# Patient Record
Sex: FEMALE | Race: WHITE | HISPANIC OR LATINO | Employment: FULL TIME | URBAN - METROPOLITAN AREA
[De-identification: names, ages, dates, MRNs, and addresses within clinical notes are randomized per-mention and may not be internally consistent; named-entity substitution may affect disease eponyms.]

---

## 2021-10-08 ENCOUNTER — ULTRASOUND (OUTPATIENT)
Dept: OBGYN CLINIC | Facility: CLINIC | Age: 31
End: 2021-10-08

## 2021-10-08 VITALS
HEIGHT: 61 IN | SYSTOLIC BLOOD PRESSURE: 107 MMHG | WEIGHT: 143 LBS | BODY MASS INDEX: 27 KG/M2 | HEART RATE: 88 BPM | DIASTOLIC BLOOD PRESSURE: 74 MMHG

## 2021-10-08 DIAGNOSIS — Z3A.12 12 WEEKS GESTATION OF PREGNANCY: Primary | ICD-10-CM

## 2021-10-08 PROCEDURE — 99203 OFFICE O/P NEW LOW 30 MIN: CPT | Performed by: OBSTETRICS & GYNECOLOGY

## 2021-10-08 RX ORDER — PNV NO.95/FERROUS FUM/FOLIC AC 28MG-0.8MG
1 TABLET ORAL DAILY
Qty: 90 TABLET | Refills: 3 | Status: SHIPPED | OUTPATIENT
Start: 2021-10-08 | End: 2022-05-16

## 2021-10-11 ENCOUNTER — APPOINTMENT (OUTPATIENT)
Dept: LAB | Facility: HOSPITAL | Age: 31
End: 2021-10-11

## 2021-10-11 DIAGNOSIS — Z3A.12 12 WEEKS GESTATION OF PREGNANCY: ICD-10-CM

## 2021-10-11 LAB
ABO GROUP BLD: NORMAL
BASOPHILS # BLD AUTO: 0.05 THOUSANDS/ΜL (ref 0–0.1)
BASOPHILS NFR BLD AUTO: 1 % (ref 0–1)
BILIRUB UR QL STRIP: NEGATIVE
BLD GP AB SCN SERPL QL: NEGATIVE
CLARITY UR: NORMAL
COLOR UR: YELLOW
EOSINOPHIL # BLD AUTO: 0.05 THOUSAND/ΜL (ref 0–0.61)
EOSINOPHIL NFR BLD AUTO: 1 % (ref 0–6)
ERYTHROCYTE [DISTWIDTH] IN BLOOD BY AUTOMATED COUNT: 11.9 % (ref 11.6–15.1)
GLUCOSE UR STRIP-MCNC: NEGATIVE MG/DL
HBV SURFACE AG SER QL: NORMAL
HCT VFR BLD AUTO: 37.8 % (ref 34.8–46.1)
HGB BLD-MCNC: 12.9 G/DL (ref 11.5–15.4)
HGB UR QL STRIP.AUTO: NEGATIVE
IMM GRANULOCYTES # BLD AUTO: 0.01 THOUSAND/UL (ref 0–0.2)
IMM GRANULOCYTES NFR BLD AUTO: 0 % (ref 0–2)
KETONES UR STRIP-MCNC: NEGATIVE MG/DL
LEUKOCYTE ESTERASE UR QL STRIP: NEGATIVE
LYMPHOCYTES # BLD AUTO: 1.77 THOUSANDS/ΜL (ref 0.6–4.47)
LYMPHOCYTES NFR BLD AUTO: 27 % (ref 14–44)
MCH RBC QN AUTO: 30.4 PG (ref 26.8–34.3)
MCHC RBC AUTO-ENTMCNC: 34.1 G/DL (ref 31.4–37.4)
MCV RBC AUTO: 89 FL (ref 82–98)
MONOCYTES # BLD AUTO: 0.46 THOUSAND/ΜL (ref 0.17–1.22)
MONOCYTES NFR BLD AUTO: 7 % (ref 4–12)
NEUTROPHILS # BLD AUTO: 4.14 THOUSANDS/ΜL (ref 1.85–7.62)
NEUTS SEG NFR BLD AUTO: 64 % (ref 43–75)
NITRITE UR QL STRIP: NEGATIVE
NRBC BLD AUTO-RTO: 0 /100 WBCS
PH UR STRIP.AUTO: 7.5 [PH]
PLATELET # BLD AUTO: 252 THOUSANDS/UL (ref 149–390)
PMV BLD AUTO: 11.1 FL (ref 8.9–12.7)
PROT UR STRIP-MCNC: NEGATIVE MG/DL
RBC # BLD AUTO: 4.24 MILLION/UL (ref 3.81–5.12)
RH BLD: POSITIVE
RUBV IGG SERPL IA-ACNC: >175 IU/ML
SP GR UR STRIP.AUTO: 1.02 (ref 1–1.03)
SPECIMEN EXPIRATION DATE: NORMAL
UROBILINOGEN UR QL STRIP.AUTO: 1 E.U./DL
WBC # BLD AUTO: 6.48 THOUSAND/UL (ref 4.31–10.16)

## 2021-10-11 PROCEDURE — 36415 COLL VENOUS BLD VENIPUNCTURE: CPT

## 2021-10-11 PROCEDURE — 80081 OBSTETRIC PANEL INC HIV TSTG: CPT

## 2021-10-11 PROCEDURE — 87086 URINE CULTURE/COLONY COUNT: CPT

## 2021-10-11 PROCEDURE — 81003 URINALYSIS AUTO W/O SCOPE: CPT

## 2021-10-12 ENCOUNTER — INITIAL PRENATAL (OUTPATIENT)
Dept: OBGYN CLINIC | Facility: CLINIC | Age: 31
End: 2021-10-12

## 2021-10-12 VITALS — WEIGHT: 145.4 LBS | BODY MASS INDEX: 29.31 KG/M2 | HEIGHT: 59 IN

## 2021-10-12 DIAGNOSIS — Z3A.12 12 WEEKS GESTATION OF PREGNANCY: Primary | ICD-10-CM

## 2021-10-12 LAB
HIV 1+2 AB+HIV1 P24 AG SERPL QL IA: NORMAL
RPR SER QL: NORMAL

## 2021-10-13 LAB — BACTERIA UR CULT: NORMAL

## 2021-10-15 ENCOUNTER — ROUTINE PRENATAL (OUTPATIENT)
Dept: PERINATAL CARE | Facility: OTHER | Age: 31
End: 2021-10-15

## 2021-10-15 VITALS
HEIGHT: 59 IN | DIASTOLIC BLOOD PRESSURE: 78 MMHG | WEIGHT: 143 LBS | BODY MASS INDEX: 28.83 KG/M2 | HEART RATE: 72 BPM | SYSTOLIC BLOOD PRESSURE: 110 MMHG

## 2021-10-15 DIAGNOSIS — Z36.82 ENCOUNTER FOR NUCHAL TRANSLUCENCY TESTING: ICD-10-CM

## 2021-10-15 DIAGNOSIS — O36.80X0 ENCOUNTER TO DETERMINE FETAL VIABILITY OF PREGNANCY, SINGLE OR UNSPECIFIED FETUS: Primary | ICD-10-CM

## 2021-10-15 DIAGNOSIS — Z3A.13 13 WEEKS GESTATION OF PREGNANCY: ICD-10-CM

## 2021-10-15 PROCEDURE — 76801 OB US < 14 WKS SINGLE FETUS: CPT | Performed by: OBSTETRICS & GYNECOLOGY

## 2021-10-15 PROCEDURE — 76813 OB US NUCHAL MEAS 1 GEST: CPT | Performed by: OBSTETRICS & GYNECOLOGY

## 2021-10-15 PROCEDURE — 99241 PR OFFICE CONSULTATION NEW/ESTAB PATIENT 15 MIN: CPT | Performed by: OBSTETRICS & GYNECOLOGY

## 2021-11-04 ENCOUNTER — ROUTINE PRENATAL (OUTPATIENT)
Dept: OBGYN CLINIC | Facility: CLINIC | Age: 31
End: 2021-11-04

## 2021-11-04 VITALS
BODY MASS INDEX: 29.03 KG/M2 | HEART RATE: 67 BPM | WEIGHT: 144 LBS | DIASTOLIC BLOOD PRESSURE: 72 MMHG | SYSTOLIC BLOOD PRESSURE: 108 MMHG | HEIGHT: 59 IN

## 2021-11-04 DIAGNOSIS — Z34.92 PRENATAL CARE, SECOND TRIMESTER: Primary | ICD-10-CM

## 2021-11-04 PROCEDURE — G0145 SCR C/V CYTO,THINLAYER,RESCR: HCPCS | Performed by: STUDENT IN AN ORGANIZED HEALTH CARE EDUCATION/TRAINING PROGRAM

## 2021-11-04 PROCEDURE — 87491 CHLMYD TRACH DNA AMP PROBE: CPT | Performed by: STUDENT IN AN ORGANIZED HEALTH CARE EDUCATION/TRAINING PROGRAM

## 2021-11-04 PROCEDURE — 99213 OFFICE O/P EST LOW 20 MIN: CPT | Performed by: OBSTETRICS & GYNECOLOGY

## 2021-11-04 PROCEDURE — G0476 HPV COMBO ASSAY CA SCREEN: HCPCS | Performed by: STUDENT IN AN ORGANIZED HEALTH CARE EDUCATION/TRAINING PROGRAM

## 2021-11-04 PROCEDURE — 87591 N.GONORRHOEAE DNA AMP PROB: CPT | Performed by: STUDENT IN AN ORGANIZED HEALTH CARE EDUCATION/TRAINING PROGRAM

## 2021-11-05 LAB
C TRACH DNA SPEC QL NAA+PROBE: NEGATIVE
N GONORRHOEA DNA SPEC QL NAA+PROBE: NEGATIVE

## 2021-11-06 LAB
HPV HR 12 DNA CVX QL NAA+PROBE: NEGATIVE
HPV16 DNA CVX QL NAA+PROBE: NEGATIVE
HPV18 DNA CVX QL NAA+PROBE: NEGATIVE

## 2021-11-11 LAB
LAB AP GYN PRIMARY INTERPRETATION: NORMAL
Lab: NORMAL

## 2021-12-02 ENCOUNTER — ROUTINE PRENATAL (OUTPATIENT)
Dept: OBGYN CLINIC | Facility: CLINIC | Age: 31
End: 2021-12-02

## 2021-12-02 ENCOUNTER — PATIENT OUTREACH (OUTPATIENT)
Dept: OBGYN CLINIC | Facility: CLINIC | Age: 31
End: 2021-12-02

## 2021-12-02 VITALS
DIASTOLIC BLOOD PRESSURE: 67 MMHG | BODY MASS INDEX: 29.84 KG/M2 | HEIGHT: 59 IN | WEIGHT: 148 LBS | SYSTOLIC BLOOD PRESSURE: 103 MMHG | HEART RATE: 69 BPM

## 2021-12-02 DIAGNOSIS — Z3A.20 20 WEEKS GESTATION OF PREGNANCY: ICD-10-CM

## 2021-12-02 DIAGNOSIS — Z34.92 PRENATAL CARE, SECOND TRIMESTER: Primary | ICD-10-CM

## 2021-12-02 DIAGNOSIS — Z78.9 LANGUAGE BARRIER: ICD-10-CM

## 2021-12-02 PROCEDURE — 99213 OFFICE O/P EST LOW 20 MIN: CPT | Performed by: NURSE PRACTITIONER

## 2021-12-06 ENCOUNTER — ROUTINE PRENATAL (OUTPATIENT)
Dept: PERINATAL CARE | Facility: CLINIC | Age: 31
End: 2021-12-06
Payer: COMMERCIAL

## 2021-12-06 VITALS
WEIGHT: 149.2 LBS | HEIGHT: 59 IN | SYSTOLIC BLOOD PRESSURE: 117 MMHG | HEART RATE: 86 BPM | BODY MASS INDEX: 30.08 KG/M2 | DIASTOLIC BLOOD PRESSURE: 65 MMHG

## 2021-12-06 DIAGNOSIS — Z3A.20 20 WEEKS GESTATION OF PREGNANCY: ICD-10-CM

## 2021-12-06 DIAGNOSIS — Z36.86 ENCOUNTER FOR ANTENATAL SCREENING FOR CERVICAL LENGTH: ICD-10-CM

## 2021-12-06 DIAGNOSIS — O09.292 PRIOR FETAL MACROSOMIA IN SECOND TRIMESTER, ANTEPARTUM: Primary | ICD-10-CM

## 2021-12-06 PROCEDURE — 76817 TRANSVAGINAL US OBSTETRIC: CPT | Performed by: OBSTETRICS & GYNECOLOGY

## 2021-12-06 PROCEDURE — 76811 OB US DETAILED SNGL FETUS: CPT | Performed by: OBSTETRICS & GYNECOLOGY

## 2021-12-06 PROCEDURE — 99213 OFFICE O/P EST LOW 20 MIN: CPT | Performed by: OBSTETRICS & GYNECOLOGY

## 2021-12-13 ENCOUNTER — TELEPHONE (OUTPATIENT)
Dept: PERINATAL CARE | Facility: CLINIC | Age: 31
End: 2021-12-13

## 2021-12-23 ENCOUNTER — LAB (OUTPATIENT)
Dept: LAB | Facility: CLINIC | Age: 31
End: 2021-12-23
Payer: COMMERCIAL

## 2021-12-23 DIAGNOSIS — Z3A.20 20 WEEKS GESTATION OF PREGNANCY: ICD-10-CM

## 2021-12-23 DIAGNOSIS — O09.292 PRIOR FETAL MACROSOMIA IN SECOND TRIMESTER, ANTEPARTUM: ICD-10-CM

## 2021-12-23 LAB — GLUCOSE 1H P 50 G GLC PO SERPL-MCNC: 105 MG/DL (ref 40–134)

## 2021-12-23 PROCEDURE — 82950 GLUCOSE TEST: CPT

## 2021-12-23 PROCEDURE — 36415 COLL VENOUS BLD VENIPUNCTURE: CPT

## 2021-12-30 ENCOUNTER — ROUTINE PRENATAL (OUTPATIENT)
Dept: OBGYN CLINIC | Facility: CLINIC | Age: 31
End: 2021-12-30

## 2021-12-30 VITALS
SYSTOLIC BLOOD PRESSURE: 100 MMHG | BODY MASS INDEX: 30.84 KG/M2 | WEIGHT: 153 LBS | HEIGHT: 59 IN | DIASTOLIC BLOOD PRESSURE: 63 MMHG | HEART RATE: 64 BPM

## 2021-12-30 DIAGNOSIS — Z34.92 PRENATAL CARE IN SECOND TRIMESTER: Primary | ICD-10-CM

## 2021-12-30 DIAGNOSIS — Z3A.24 24 WEEKS GESTATION OF PREGNANCY: ICD-10-CM

## 2021-12-30 PROBLEM — Z3A.20 20 WEEKS GESTATION OF PREGNANCY: Status: RESOLVED | Noted: 2021-10-15 | Resolved: 2021-12-30

## 2021-12-30 PROCEDURE — 99213 OFFICE O/P EST LOW 20 MIN: CPT | Performed by: OBSTETRICS & GYNECOLOGY

## 2021-12-30 PROCEDURE — 90686 IIV4 VACC NO PRSV 0.5 ML IM: CPT | Performed by: OBSTETRICS & GYNECOLOGY

## 2021-12-30 PROCEDURE — 90471 IMMUNIZATION ADMIN: CPT | Performed by: OBSTETRICS & GYNECOLOGY

## 2022-01-27 ENCOUNTER — ROUTINE PRENATAL (OUTPATIENT)
Dept: OBGYN CLINIC | Facility: CLINIC | Age: 32
End: 2022-01-27

## 2022-01-27 VITALS
WEIGHT: 156 LBS | HEART RATE: 74 BPM | BODY MASS INDEX: 31.45 KG/M2 | DIASTOLIC BLOOD PRESSURE: 68 MMHG | SYSTOLIC BLOOD PRESSURE: 106 MMHG | HEIGHT: 59 IN

## 2022-01-27 DIAGNOSIS — Z23 NEED FOR TDAP VACCINATION: ICD-10-CM

## 2022-01-27 DIAGNOSIS — Z34.93 PRENATAL CARE IN THIRD TRIMESTER: Primary | ICD-10-CM

## 2022-01-27 PROCEDURE — 90715 TDAP VACCINE 7 YRS/> IM: CPT

## 2022-01-27 PROCEDURE — 99213 OFFICE O/P EST LOW 20 MIN: CPT | Performed by: OBSTETRICS & GYNECOLOGY

## 2022-01-27 PROCEDURE — 90471 IMMUNIZATION ADMIN: CPT

## 2022-01-27 NOTE — PROGRESS NOTES
[address]  [facility]  OB/GYN prenatal visit    S: 28 y o  [id number] 28w0d here for PN visit  She has no obstetric complaints, including pelvic pain, contractions, vaginal bleeding, loss of fluid, or decreased fetal movement   no    O:  Vitals:    22 1033   BP: 106/68   Pulse: 74       Gen: no acute distress, nonlabored breathing  Fundal Height (cm): 27 cm  Fetal Heart Rate: 130    A/P:      IUP at 28w0d  No obstetric complaints today  Vaccinations: Flu , Tdap given today  Contraception: Depo  Breastfeeding: yes  Birth plan: vaginal delivery  Discussed  labor precautions and fetal kick counts    Return to clinic in 2 weeks  Delivery consent to be signed at next visit  28w labs given    COVID 19 precautions were discussed with patient at length, reviewed symptoms, hygiene, social distancing, patient to call office  with questions/concerns        Juliana Donahue MD  2022  11:57 AM

## 2022-01-27 NOTE — PROGRESS NOTES
28 week education packet provided to patient on 1/27/2022  Included in packet:   Third Trimester paperwork  Delivery consent   Birthing room support person rules and acknowledgment  Birth Plan   Welcome information  Birth certificate worksheet   Consent for Photographers  Perineal/ Vaginal massage   Pediatric practices and locations

## 2022-02-10 ENCOUNTER — ROUTINE PRENATAL (OUTPATIENT)
Dept: OBGYN CLINIC | Facility: CLINIC | Age: 32
End: 2022-02-10

## 2022-02-10 VITALS
BODY MASS INDEX: 31.25 KG/M2 | DIASTOLIC BLOOD PRESSURE: 66 MMHG | SYSTOLIC BLOOD PRESSURE: 102 MMHG | HEART RATE: 73 BPM | HEIGHT: 59 IN | WEIGHT: 155 LBS

## 2022-02-10 DIAGNOSIS — Z34.93 PRENATAL CARE, THIRD TRIMESTER: Primary | ICD-10-CM

## 2022-02-10 PROCEDURE — 99213 OFFICE O/P EST LOW 20 MIN: CPT | Performed by: OBSTETRICS & GYNECOLOGY

## 2022-02-10 NOTE — PROGRESS NOTES
[address]  [facility]  OB/GYN prenatal visit    S: 28 y o  [id number] 30w0d here for PN visit  She has no obstetric complaints, including pelvic pain, contractions, vaginal bleeding, loss of fluid, or decreased fetal movement       O:  Vitals:    02/10/22 1032   BP: 102/66   Pulse: 73       Gen: no acute distress, nonlabored breathing  Fundal Height (cm): 29 cm  Fetal Heart Rate: 135    A/P:      IUP at 30w0d  No obstetric complaints today  Vaccinations: UTD on tdap and flu  Contraception: Depo  Breastfeeding: yes  Birth plan: discussed elective induction/repeat /TOLAC, patient opts for vaginal delivery  Discussed  labor precautions and fetal kick counts    Return to clinic in 2 weeks        COVID 19 precautions were discussed with patient at length, reviewed symptoms, hygiene, social distancing, patient to call office  with Randal Dickerson MD  2/10/2022  10:57 AM

## 2022-02-17 ENCOUNTER — APPOINTMENT (OUTPATIENT)
Dept: LAB | Facility: CLINIC | Age: 32
End: 2022-02-17
Payer: COMMERCIAL

## 2022-02-17 DIAGNOSIS — Z34.93 PRENATAL CARE IN THIRD TRIMESTER: ICD-10-CM

## 2022-02-17 LAB
ERYTHROCYTE [DISTWIDTH] IN BLOOD BY AUTOMATED COUNT: 12.5 % (ref 11.6–15.1)
GLUCOSE 1H P 50 G GLC PO SERPL-MCNC: 103 MG/DL (ref 40–134)
HCT VFR BLD AUTO: 36.4 % (ref 34.8–46.1)
HGB BLD-MCNC: 12.2 G/DL (ref 11.5–15.4)
MCH RBC QN AUTO: 31.1 PG (ref 26.8–34.3)
MCHC RBC AUTO-ENTMCNC: 33.5 G/DL (ref 31.4–37.4)
MCV RBC AUTO: 93 FL (ref 82–98)
PLATELET # BLD AUTO: 229 THOUSANDS/UL (ref 149–390)
PMV BLD AUTO: 10.3 FL (ref 8.9–12.7)
RBC # BLD AUTO: 3.92 MILLION/UL (ref 3.81–5.12)
WBC # BLD AUTO: 6.82 THOUSAND/UL (ref 4.31–10.16)

## 2022-02-17 PROCEDURE — 36415 COLL VENOUS BLD VENIPUNCTURE: CPT

## 2022-02-17 PROCEDURE — 82950 GLUCOSE TEST: CPT

## 2022-02-17 PROCEDURE — 86592 SYPHILIS TEST NON-TREP QUAL: CPT

## 2022-02-17 PROCEDURE — 85027 COMPLETE CBC AUTOMATED: CPT

## 2022-02-18 LAB — RPR SER QL: NORMAL

## 2022-02-24 ENCOUNTER — ROUTINE PRENATAL (OUTPATIENT)
Dept: OBGYN CLINIC | Facility: CLINIC | Age: 32
End: 2022-02-24

## 2022-02-24 VITALS
BODY MASS INDEX: 31.85 KG/M2 | HEART RATE: 71 BPM | WEIGHT: 158 LBS | SYSTOLIC BLOOD PRESSURE: 105 MMHG | DIASTOLIC BLOOD PRESSURE: 66 MMHG | HEIGHT: 59 IN

## 2022-02-24 DIAGNOSIS — Z34.93 PRENATAL CARE IN THIRD TRIMESTER: Primary | ICD-10-CM

## 2022-02-24 PROCEDURE — 99213 OFFICE O/P EST LOW 20 MIN: CPT | Performed by: OBSTETRICS & GYNECOLOGY

## 2022-02-24 NOTE — PROGRESS NOTES
[facility]  OB/GYN prenatal visit    S: 28 y o  [id number] 32w0d here for PN visit  She has no obstetric complaints, including pelvic pain, contractions, vaginal bleeding, loss of fluid, or decreased fetal movement   One Month  [id number]    O:  Vitals:    22 0907   BP: 105/66   Pulse: 71       Gen: no acute distress, nonlabored breathing  Fundal Height (cm): 32 cm  Fetal Heart Rate: 145    A/P:      IUP at 32w0d  No obstetric complaints today  Vaccinations: UTD  Contraception: Depo  Breastfeeding: yes and bottle  Birth plan:  discussed elective induction/repeat /TOLAC, patient opts for vaginal delivery  Discussed  labor precautions and fetal kick counts    Return to clinic in 2 weeks        COVID 19 precautions were discussed with patient at length, reviewed symptoms, hygiene, social distancing, patient to call office  with Erika Anna MD  2022  9:45 AM

## 2022-02-28 ENCOUNTER — ULTRASOUND (OUTPATIENT)
Dept: PERINATAL CARE | Facility: OTHER | Age: 32
End: 2022-02-28
Payer: COMMERCIAL

## 2022-02-28 VITALS
SYSTOLIC BLOOD PRESSURE: 115 MMHG | HEART RATE: 71 BPM | WEIGHT: 158.07 LBS | BODY MASS INDEX: 31.93 KG/M2 | DIASTOLIC BLOOD PRESSURE: 72 MMHG

## 2022-02-28 DIAGNOSIS — O09.292 PRIOR FETAL MACROSOMIA IN SECOND TRIMESTER, ANTEPARTUM: Primary | ICD-10-CM

## 2022-02-28 DIAGNOSIS — Z3A.32 32 WEEKS GESTATION OF PREGNANCY: ICD-10-CM

## 2022-02-28 PROCEDURE — 76816 OB US FOLLOW-UP PER FETUS: CPT | Performed by: OBSTETRICS & GYNECOLOGY

## 2022-02-28 PROCEDURE — 99212 OFFICE O/P EST SF 10 MIN: CPT | Performed by: OBSTETRICS & GYNECOLOGY

## 2022-03-06 NOTE — PROGRESS NOTES
The patient was seen today for an ultrasound  Please see ultrasound report (located under Ob Procedures) for additional details  Thank you very much for allowing us to participate in the care of this very nice patient  Should you have any questions, please do not hesitate to contact me  MD Belem Herberth  Attending Physician, Mayela

## 2022-03-09 PROBLEM — O09.293 PRIOR FETAL MACROSOMIA, ANTEPARTUM, THIRD TRIMESTER: Status: ACTIVE | Noted: 2021-12-06

## 2022-03-09 PROBLEM — Z3A.33 33 WEEKS GESTATION OF PREGNANCY: Status: ACTIVE | Noted: 2022-03-09

## 2022-03-09 NOTE — PROGRESS NOTES
Milagro Ware presents today for routine OB visit at 33w6d  Blood Pressure: 104/63  Wt=70 8 kg (156 lb); Body mass index is 31 51 kg/m² ; TWG=Not found  Fetal Heart Rate: 137; Fundal Height (cm): 33 cm  Abdomen: gravid, soft, non-tender  Denies uterine contractions  Denies vaginal bleeding or leaking of fluid  Reports adequate fetal movement of at least 10 movements in 2 hours once daily  Has had Tdap, has had flu vaccine  28 week labs the 1 hour GTT was 103, hemoglobin was 12 2  Her last ultrasound was 02/28/2022, vertex, fetal growth at 63%  Scheduled for ultrasound as clinically indicated  Contraception- depo  Breastfeeding and bottle feeding  Epidural- yes  Reviewed perineal massage  Reviewed premature labor precautions and fetal kick counts  Advised to continue medications and return in 2 weeks  Has had COVID vaccine x2  COVID 19 precautions were discussed with patient, reviewed symptoms, masking, hygiene, social distancing, avoid high risk gatherings, patient to call office with questions or concerns      Current Outpatient Medications   Medication Instructions    Prenatal Vit-Fe Fumarate-FA (Prenatal Vitamin) 27-0 8 MG TABS 1 tablet, Oral, Daily         Pregnancy Problems (from 07/15/21 to present)     No problems associated with this episode

## 2022-03-09 NOTE — PATIENT INSTRUCTIONS
El embarazo de la semana 35 a la 38   CUIDADO AMBULATORIO:   Cambios que están ocurriendo en rubalcava cuerpo: Al principio de las 37 semanas se considera que usted está en término completo  Podría ser más fácil que usted respire si rubalcava bebé se ha posicionado con la stephen hacia abajo  Es posible que usted necesite orinar con mayor frecuencia ya que el bebé podría estar presionando rubalcava vejiga  Usted también podría sentir más molestias y cansarse fácilmente  Busque atención médica de inmediato si:  · Usted presenta un adriane dolor de stephen que no desaparece  · Usted tiene cambios en la visión nuevos o en aumento, antonieta visión borrosa o con manchas  · Usted tiene inflamación nueva o creciente en rubalcava johanna o anila  · Usted tiene manchado o sangrado vaginal     · Usted rompe layton o siente un chorro o gotas de agua tibia que le está bajando por rubalcava vagina  Llame a rubalcava obstetra si:  · Usted tiene más de 5 contracciones en 1 hora  · Usted nota algún cambio en los movimientos de rubalcava bebé  · Usted tiene calambres, presión o tensión abdominal     · Usted tiene un cambio en la secreción vaginal     · Usted tiene escalofríos o fiebre  · Usted tiene comezón, ardor o dolor vaginal     · Usted tiene ned secreción vaginal amarillenta, verdosa, donnie o de [employer]  · Usted tiene dolor o ardor al [place], orina menos de lo habitual o tiene [place] rosada o sanguinolenta  · Usted tiene preguntas o inquietudes acerca de rubalcava condición o cuidado  Cómo cuidarse en esta etapa de rubalcava embarazo:      · Consuma alimentos saludables y variados  Alimentos saludables incluyen frutas, verduras, panes de cristina integral, alimentos lácteos bajos en grasa, frijoles, abdullahi magras y pescado  Tiro líquidos antonieta se le haya indicado  Pregunte cuánto líquido debe lin cada día y cuáles líquidos son los más adecuados para usted  Limite el consumo de cafeína a menos de [address]   Limite el consumo de pescado a 2 porciones cada semana  Escoja pescado con concentraciones bajas de levi antonieta atún al natural enlatado, camarón, salmón, bacalao o tilapia  No coma pescado con concentraciones altas de levi antonieta pez arnol, caballa gigante, pargo rayado y tiburón  · [id number] [place]  Rubalcava necesidad de ciertas vitaminas y [address], antonieta el ácido fólico, aumenta roman el [facility]  Las vitaminas prenatales proporcionan algunas de las vitaminas y minerales adicionales que usted necesita  Las vitaminas prenatales también podrían ayudar a disminuir el riesgo de ciertos defectos de nacimiento  · Descanse tanto antonieta sea necesario  Levante vanesa pies si tiene inflamación en vanesa tobillos y pies  · Consulte con rubalcava médico acerca de hacer ejercicio  El ejercicio moderado puede ayudarla a mantenerse en forma  Rubalcava médico la ayudará a planear un programa de ejercicios que sea seguro para usted roman rubalcava [facility]  · No fume  Fumar aumenta el riesgo de aborto espontáneo y otros problemas de carmencita roman rubalcava [facility]  Fumar puede causar que rubalcava bebé nazca antes de tiempo o que pese menos al nacer  Solicite información a rubalcava médico si usted necesita ayuda para dejar de fumar  · No consuma alcohol  El alcohol pasa de rubalcava cuerpo al bebé a través de la placenta  Puede afectar el desarrollo del cerebro de rubalcava bebé y provocar el síndrome de alcoholismo fetal (SAF)  El SAF es un tong de condiciones que causan problemas [place], de comportamiento y de crecimiento  · Consulte con rubalcava médico antes de lin cualquier medicamento  Muchos medicamentos pueden perjudicar a rubalcava bebé si usted los ocnsuelo [address]  No tome ningún medicamento, vitaminas, hierbas o suplementos sin jose consultar con rubalcava Junior Shape  use drogas ilegales o de la segundo (antonieta marihuana o cocaína) mientras está embarazada  Consejos de seguridad roman el embarazo:  · Evite jacuzzis y saunas   No use un jacuzzi o un sauna mientras usted está embarazada, especialmente roman el primer trimestre  Los [facility] y los saunas aumentan la temperatura de rubalcava bebé y el riesgo de defectos de nacimiento  · Evite la toxoplasmosis  [place] es ned infección causada por comer carne cruda o estar cerca del excremento de un álvaro infectado  [place] puede causar malformaciones congénitas, aborto espontáneo y Jamie Schein  Lávese las anila después de tocar carne cruda  Asegúrese de que la carne esté melo cocida antes de comerla  Evite los huevos crudos y la [place]  Use guantes o pida que alguien la ayude a limpiar la caja de arena del álvaro mientras usted Otila Hopper  · Consulte con rubalcava médico acerca de viajar  El [address] cómodo para viajar es roman el jorge trimestre  Pregunte a rubalcava médico si puede viajar después de las 36 semanas  Es posible que no pueda viajar en avión después de las 36 [address] [place]  También le puede recomendar que evite los viajes largos por carretera  Cambios que ocurren con rubalcava bebé: Para las 38 semanas, rubalcava bebé podría pesar entre 6 y 5 libras  Rubalcava bebé podría medir alrededor de 14 pulgadas de jonnathan desde la punta de la stephen hasta la rabadilla (parte inferior del bebé)  Rubalcava bebé escucha lo suficientemente melo antonieta para reconocer rubalcava voz  Conforme rubalcava bebé crece más, usted podría sentir menos patadas y sentir más que rubalcava bebé se estira y [place]  Rubalcava bebé podría moverse en posición con la stephen hacia abajo  Rubalcava bebé también descansará en la parte inferior de rubalcava abdomen  Lo que necesita saber acerca del cuidado prenatal: Rubalcava médico le revisará rubalcava presión arterial y [place]  Es posible que también necesite lo siguiente:  · Un examen de orina también podría realizarse para revisarle el azúcar y la proteína  Estas son señales de diabetes gestacional o de infección  La proteína en rubalcava orina también podría ser ned señal de preeclampsia   La preeclampsia es ned condición que puede desarrollarse roman la semana 20 o después en daniel embarazo  Esta provoca presión arterial triston y [employer] con vanesa riñones y [place]  · La detección de diabetes gestacional podría realizarse  Daniel médico le puede ordenar la curva de tolerancia a la glucosa (OGTT) de 1 paso o de 2 pasos  ? Examen de tolerancia a la glucosa en 1 paso: A usted le revisarán el nivel de azúcar en la ryanne después de que no haya comido por 8 horas (en ayunas)  Luego le darán a lin ned solución de glucosa  Le examinarán el nivel de glucosa nuevamente 1 hora y 2 horas después de terminar la bebida  ? Examen de tolerancia a la glucosa en 2 paso: Usted no tiene que ayunar para la primera parte del examen  Usted se tomará la bebida de glucosa a cualquier hora del día  A usted le revisarán el nivel de azúcar en la ryanne al cabo de 1 hora  En jack que el nivel de azúcar esté más alto que cierto nivel, le ordenarán otro examen  Usted tendrá que ayunar para que le revisen el azúcar en daniel ryanne  Usted se tomará la bebida de glucosa  Le examinarán la ryanne de nuevo [address], 2 horas y 3 horas después de terminarse la bebida de glucosa  · Los análisis de ryanne se pueden realizar para revisar si tiene signos de anemia (nivel bajo del olena)  · La vacuna Tdap podría ser recomendado por daniel médico     · El examen del estreptococo del tong B es un examen que se realiza para verificar si hay infección por estreptococos del tong B  El estreptococo del tong B es un tipo de bacteria que se puede encontrar en la vagina o el recto  Podría ser transmitida a daniel bebé roman el parto si usted la tiene  Daniel médico podría lin Bill Katarina de daniel vagina o recto y marii la Ronn Palomares al laboratorio para que la examinen  · La altura uterina es ned medición del útero para controlar el desarrollo de daniel bebé  [employer] por lo general es igual al número de [address] que usted tiene de embarazo   Daniel médico también podría revisar la posición de daniel bebé     · El ritmo cardíaco de rubalcava bebé será revisado  Programe ned vania con rubalcava obstétrico antonieta se le indique: Anote vanesa preguntas para que se acuerde de hacerlas roman vanesa visitas  © Copyright [email or web address] 2022 Information is for End User's use only and may not be sold, redistributed or otherwise used for commercial purposes  All illustrations and images included in CareNotes® are the copyrighted property of A D A M [employer]  or [address] [place] [address] es sólo para uso en educación  Rubalcava intención no es darle un consejo médico sobre enfermedades o tratamientos  Colsulte con rubalcava Ally Mall farmacéutico antes de seguir cualquier régimen médico para saber si es seguro y efectivo para usted  El embarazo de la semana 31 a la 29   CUIDADO AMBULATORIO:   Cambios que están ocurriendo en rubalcava cuerpo: Es posible que usted continúe teniendo síntomas tales antonieta falta de [place], [place], contracciones o inflamación en vanesa tobillos y pies  Usted podría estar aumentando 1 fransisca por semana ahora  Busque atención médica de inmediato si:  · Usted presenta un adriane dolor de stephen que no desaparece  · Usted tiene cambios en la visión nuevos o en aumento, antonieta visión borrosa o con manchas  · Usted tiene inflamación nueva o creciente en rubalcava johanna o anila  · Usted tiene manchado o sangrado vaginal     · Usted rompe layton o siente un chorro o gotas de agua tibia que le está bajando por rubalcava vagina  Llame a rubalcava obstetra si:  · Usted tiene más de 5 contracciones en 1 hora  · Usted nota algún cambio en los movimientos de rubalcava bebé  · Usted tiene calambres, presión o tensión abdominal     · Usted tiene un cambio en la secreción vaginal     · Usted tiene escalofríos o fiebre  · Usted tiene comezón, ardor o dolor vaginal     · Usted tiene ned secreción vaginal amarillenta, verdosa, donnie o de [employer]      · Usted tiene dolor o ardor al Jettie Poll, orina menos de lo habitual o tiene [place] o sanguinolenta  · Usted tiene preguntas o inquietudes acerca de rubalcava condición o cuidado  Cómo cuidarse en esta etapa de rubalcava embarazo:      · Consuma alimentos saludables y variados  Alimentos saludables incluyen frutas, verduras, panes de cristina integral, alimentos lácteos bajos en grasa, frijoles, abdullahi magras y pescado  Collbran líquidos antonieta se le haya indicado  Pregunte cuánto líquido debe lin cada día y cuáles líquidos son los más adecuados para usted  Limite el consumo de cafeína a menos de [address]  Limite el consumo de pescado a 2 porciones cada semana  Escoja pescado con concentraciones bajas de levi antonieta atún al natural enlatado, camarón, salmón, bacalao o tilapia  No coma pescado con concentraciones altas de levi antonieta pez arnol, caballa gigante, pargo rayado y tiburón  · Controle la acidez comiendo 4 o 5 comidas pequeñas cada día en vez de comidas grandes  Evite los alimentos picantes  · Controle la inflamación al [facility] [employer] y poner los pies en alto o elevados sobre [place]  · [address] [place]  Rubalcava necesidad de ciertas vitaminas y [facility], antonieta el ácido fólico, aumenta roman el [facility]  Las vitaminas prenatales proporcionan algunas de las vitaminas y minerales adicionales que usted necesita  Las vitaminas prenatales también podrían ayudar a disminuir el riesgo de ciertos defectos de nacimiento  · Consulte con rubalcava médico acerca de hacer ejercicio  El ejercicio moderado puede ayudarla a mantenerse en forma  Rubalcava médico la ayudará a planear un programa de ejercicios que sea seguro para usted roman rubalcava [facility]  · No fume  Fumar aumenta el riesgo de aborto espontáneo y otros problemas de carmencita roman rubalcava [place]  Fumar puede causar que rubalcava bebé nazca antes de tiempo o que pese menos al nacer  Solicite información a rubalcava médico si usted necesita ayuda para dejar de fumar  · No consuma alcohol   El alcohol pasa de rubalcava cuerpo al bebé a través de la placenta  Puede afectar el desarrollo del cerebro de rubalcava bebé y provocar el síndrome de alcoholismo fetal (SAF)  El SAF es un tong de condiciones que causan problemas [place], de comportamiento y de crecimiento  · Consulte con rubalcava médico antes de lin cualquier medicamento  Muchos medicamentos pueden perjudicar a rubalcava bebé si usted los consuelo [address] [place] [address]  No tome ningún medicamento, vitaminas, hierbas o suplementos sin jose consultar con rubalcava [place]  use drogas ilegales o de la sgeundo (antonieta marihuana o cocaína) mientras está embarazada  Consejos de seguridad roman el embarazo:  · Evite jacuzzis y saunas  No use un jacuzzi o un sauna mientras usted está embarazada, especialmente roman el primer trimestre  Los [facility] y los saunas aumentan la temperatura de rubalcava bebé y el riesgo de defectos de nacimiento  · Evite la toxoplasmosis  [place] es ned infección causada por comer carne cruda o estar cerca del excremento de un álvaro infectado  [place] puede causar malformaciones congénitas, aborto espontáneo y Jamie Schein  Lávese las anila después de tocar carne cruda  Asegúrese de que la carne esté mleo cocida antes de comerla  Evite los huevos crudos y la [place]  Use guantes o pida que alguien la ayude a limpiar la caja de arena del álvaro mientras usted Vonna Bye  Cambios que ocurren con rubalcava bebé: Para las 34 semanas, rubalcava bebé podría pesar más de 5 [address] [facility]  Rubalcava bebé medirá alrededor de 12 ½ pulgadas de jonnathan desde el hollis de la stephne hasta la rabadilla (parte inferior del bebé)  Rubalcava bebé está aumentando alrededor de ½ fransisca por semana  Ahora rubalcava bebé puede abrir y cerrar vanesa ojos  Las patadas y movimientos de rubalcava bebé son más melissa en bishnu momento  Lo que necesita saber acerca del cuidado prenatal: Rubalcava médico le revisará rubalcava presión arterial y [place]   Es posible que también necesite lo siguiente:  · Un examen de orina también podría realizarse para revisarle el azúcar y la proteína  Estas son señales de diabetes gestacional o de infección  La proteína en rubalcava orina también podría ser ned señal de preeclampsia  La preeclampsia es ned condición que puede desarrollarse roman la semana 21 o después en rubalcava embarazo  Esta provoca presión arterial triston y [employer] con vanesa riñones y [place]  · La detección de diabetes gestacional podría realizarse  Rubalcava médico le puede ordenar la curva de tolerancia a la glucosa (OGTT) de 1 paso o de 2 pasos  ? Examen de tolerancia a la glucosa en 1 paso: A usted le revisarán el nivel de azúcar en la ryanne después de que no haya comido por 8 horas (en ayunas)  Luego le darán a lin ned solución de glucosa  Le examinarán el nivel de glucosa nuevamente 1 hora y 2 horas después de terminar la bebida  ? Examen de tolerancia a la glucosa en 2 paso: Usted no tiene que ayunar para la primera parte del examen  Usted se tomará la bebida de glucosa a cualquier hora del día  A usted le revisarán el nivel de azúcar en la ryanne al cabo de 1 hora  En jack que el nivel de azúcar esté más alto que cierto nivel, le ordenarán otro examen  Usted tendrá que ayunar para que le revisen el azúcar en rubalcava ryanne  Usted se tomará la bebida de glucosa  Le examinarán la ryanne de nuevo [address], 2 horas y 3 horas después de terminarse la bebida de glucosa  · La vacuna Tdap podría ser recomendado por rubalcava médico     · La altura uterina es ned medición del útero para controlar el desarrollo de rubalcava bebé  [employer] por lo general es igual al número de [facility] que usted tiene de embarazo  Rubalcava médico también podría revisar la posición de rubalcava bebé  · El ritmo cardíaco de rubalcava bebé será revisado  Programe ned vania con rubalcava obstétrico antonieta se le indique: Anote vanesa preguntas para que se acuerde de hacerlas roman vanesa visitas    © Copyright [employer] 2022 Information is for End User's use only and may not be sold, redistributed or otherwise used for commercial purposes  All illustrations and images included in CareNotes® are the copyrighted property of A D A M , Inc  or 0 [facility] es sólo para uso en educación  Rubalcava intención no es darle un consejo médico sobre enfermedades o tratamientos  Colsulte con rubalcava Verlon Eloise farmacéutico antes de seguir cualquier régimen médico para saber si es seguro y efectivo para usted  Movimiento fetal   LO QUE NECESITA SABER:   Los movimientos fetales son las patadas, vueltas e hipo del bebé en el [place]  Es posible que usted empiece a sentir estos movimientos aproximadamente a las [address]  A medida que rubalacva bebé crezca, los movimientos se sienten más melissa y son Suellyn Sjogren frecuentes  Los movimientos del feto comprueban que rubalcava bebé en el útero está recibiendo el oxígeno y los nutrientes necesarios antes de nacer  La reducción de movimientos fetales podría señalar un problema de la carmencita de rubalcava bebé  INSTRUCCIONES SOBRE EL [facility]:   Lam Rhoda a vanesa consultas de control con rubalcava médico u obstetra según le indicaron: Anote vanesa preguntas para que se acuerde de hacerlas roman vanesa visitas  Movimientos normales del feto: La actividad del keyshawn en el útero puede describirse en 4 estados, del menos activo al [place] na [facility]  Roman el estado de sueño [facility], es probable que rubalcava nixon por nacer permanezca quieto un lars de 2 horas  Roman el estado de sueño [place], rubalcava bebé patalea, da vueltas y se mueve con frecuencia  Roman el estado vigilia tranquila, rubalcava nixon podría solamente  vanesa ojos  El estado despierto activo incluye patadas melissa y vueltas  Lo que afecta el movimiento fetal: Es posible que sienta a rubalcava bebé moverse más después de que usted haya comido o bebido cafeína  Es probable que usted sienta menos movimientos de rubalcava bebé en el útero cuando está más [place], antonieta cuando hace ejercicio  También podría sentir menos movimientos del feto si usted es Padilla Freire  Ciertos medicamentos pueden afectar los movimientos de rubalcava bebé  Infórmele a rubalcava médico los medicamentos que consuelo  Monitoreo de los movimientos del bebé en la casa: La mayoría de los movimientos de rubalcava bebé en el útero se notan cuando usted se acuesta silenciosamente de lado  Es probable que rubalcava médico le pida que cuente los movimientos del feto roman 2 horas  Podría pedirle que registre el tiempo que rubalcava bebé dura para realizar 10 movimientos  Mantenga un registro de los movimientos de rubalcava bebé  Comuníquese con rubalcava médico u obstetra si:  · Tarda más de lo usual para sentir 10 movimientos de rubalcava bebé en el útero  · Usted no siente a rubalcava bebé moverse en el útero por lo menos 10 veces cada 2 horas  · La piel de vanesa anila, pies y alrededor de vanesa ojos se encuentra más inflamada de lo normal     · Usted tiene dolor de stephen roman por lo menos 24 horas  · Le aparecen puntos rojos pequeñitos en la piel  · Rubalcava estómago se siente sensible al aplicarle presión  · Usted tiene preguntas o inquietudes acerca de rubalcava condición o cuidado  Regrese a la adamaris de emergencias si:  · Usted no siente a rubalcava bebé en el útero moverse por 12 horas  · Usted siente calambres o dolor gerry en el abdomen  · Usted tiene sangrado vaginal abundante  · Usted tiene dolor de [place] severo y no puede osiris con claridad  · Usted tiene dificultad para respirar o está vomitando  · Usted sufre ned convulsión  © Copyright [employer] 2022 Information is for End User's use only and may not be sold, redistributed or otherwise used for commercial purposes  All illustrations and images included in CareNotes® are the copyrighted property of A D A M , [address] es sólo para uso en educación  Rubalcava intención no es darle un consejo médico sobre enfermedades o tratamientos   Colsulte con rubalcava [place] farmacéutico antes de seguir cualquier régimen médico para saber si es seguro y [employer] para usted

## 2022-03-10 ENCOUNTER — ROUTINE PRENATAL (OUTPATIENT)
Dept: OBGYN CLINIC | Facility: CLINIC | Age: 32
End: 2022-03-10

## 2022-03-10 VITALS
HEIGHT: 59 IN | BODY MASS INDEX: 31.45 KG/M2 | DIASTOLIC BLOOD PRESSURE: 63 MMHG | SYSTOLIC BLOOD PRESSURE: 104 MMHG | HEART RATE: 70 BPM | WEIGHT: 156 LBS

## 2022-03-10 DIAGNOSIS — Z3A.33 33 WEEKS GESTATION OF PREGNANCY: ICD-10-CM

## 2022-03-10 DIAGNOSIS — Z78.9 LANGUAGE BARRIER: ICD-10-CM

## 2022-03-10 DIAGNOSIS — O09.293 PRIOR FETAL MACROSOMIA, ANTEPARTUM, THIRD TRIMESTER: Primary | ICD-10-CM

## 2022-03-10 PROCEDURE — 99213 OFFICE O/P EST LOW 20 MIN: CPT | Performed by: NURSE PRACTITIONER

## 2022-03-24 ENCOUNTER — ROUTINE PRENATAL (OUTPATIENT)
Dept: OBGYN CLINIC | Facility: CLINIC | Age: 32
End: 2022-03-24

## 2022-03-24 VITALS
BODY MASS INDEX: 33.06 KG/M2 | WEIGHT: 164 LBS | HEIGHT: 59 IN | HEART RATE: 75 BPM | DIASTOLIC BLOOD PRESSURE: 66 MMHG | SYSTOLIC BLOOD PRESSURE: 108 MMHG

## 2022-03-24 DIAGNOSIS — Z3A.36 36 WEEKS GESTATION OF PREGNANCY: Primary | ICD-10-CM

## 2022-03-24 DIAGNOSIS — O09.293 PRIOR FETAL MACROSOMIA, ANTEPARTUM, THIRD TRIMESTER: ICD-10-CM

## 2022-03-24 DIAGNOSIS — Z3A.33 33 WEEKS GESTATION OF PREGNANCY: ICD-10-CM

## 2022-03-24 PROCEDURE — 87150 DNA/RNA AMPLIFIED PROBE: CPT | Performed by: OBSTETRICS & GYNECOLOGY

## 2022-03-24 PROCEDURE — 99213 OFFICE O/P EST LOW 20 MIN: CPT | Performed by: OBSTETRICS & GYNECOLOGY

## 2022-03-24 NOTE — PROGRESS NOTES
OB/GYN prenatal visit    S: 28 y o  [id number] 36w0d here for PN visit  She has no obstetric complaints, including pelvic pain, contractions, vaginal bleeding, loss of fluid, or decreased fetal movement       O:  Vitals:    22 1317   BP: 108/66   Pulse: 75       Gen: no acute distress, nonlabored breathing  Fundal Height (cm): 35 cm  Fetal Heart Rate: 142    A/P:  36 weeks gestation of pregnancy  - No OB complaints  - Immunization: COVID x2 , TDAP and flu were given  - Contraception: Depo provera   - GBS collected     IUP at 36w0d  No obstetric complaints today  TWG: + Not charted  (recommended weight gain 15-30 lbs)  Flu vaccine yes,COVID vac x 2,  TDAP vaccine yes  Contraception: Depo   Breastfeeding: yes  Birth plan: yes about epidural  Discussed  labor precautions and fetal kick counts    Return to clinic in 2 weeks    COVID 19 precautions were discussed with patient at length, reviewed symptoms, hygiene, social distancing, patient to call office  with questions/concerns    36 weeks: collect GBS/PCN allergy, check fetal position, contraception and birth plan, Marcie Florez MD  [id number]  0:37 PM

## 2022-03-24 NOTE — ASSESSMENT & PLAN NOTE
- No OB complaints  - Immunization: COVID x2 , TDAP and flu were given  - Contraception: Depo provera

## 2022-03-26 LAB — GP B STREP DNA SPEC QL NAA+PROBE: NEGATIVE

## 2022-03-31 ENCOUNTER — ROUTINE PRENATAL (OUTPATIENT)
Dept: OBGYN CLINIC | Facility: CLINIC | Age: 32
End: 2022-03-31

## 2022-03-31 VITALS
BODY MASS INDEX: 32.66 KG/M2 | SYSTOLIC BLOOD PRESSURE: 104 MMHG | HEIGHT: 59 IN | WEIGHT: 162 LBS | HEART RATE: 65 BPM | DIASTOLIC BLOOD PRESSURE: 64 MMHG

## 2022-03-31 DIAGNOSIS — Z3A.37 37 WEEKS GESTATION OF PREGNANCY: ICD-10-CM

## 2022-03-31 DIAGNOSIS — O09.293 PRIOR FETAL MACROSOMIA, ANTEPARTUM, THIRD TRIMESTER: ICD-10-CM

## 2022-03-31 DIAGNOSIS — Z78.9 LANGUAGE BARRIER: Primary | ICD-10-CM

## 2022-03-31 PROCEDURE — 99213 OFFICE O/P EST LOW 20 MIN: CPT | Performed by: NURSE PRACTITIONER

## 2022-03-31 NOTE — PATIENT INSTRUCTIONS
El embarazo de la semana 35 a la 38   CUIDADO AMBULATORIO:   Cambios que están ocurriendo en rubalcava cuerpo: Al principio de las 37 semanas se considera que usted está en término completo  Podría ser más fácil que usted respire si rubalcava bebé se ha posicionado con la stephen hacia abajo  Es posible que usted necesite orinar con mayor frecuencia ya que el bebé podría estar presionando rubaclava vejiga  Usted también podría sentir más molestias y cansarse fácilmente  Busque atención médica de inmediato si:  · Usted presenta un adriane dolor de stephen que no desaparece  · Usted tiene cambios en la visión nuevos o en aumento, antonieta visión borrosa o con manchas  · Usted tiene inflamación nueva o creciente en rubalcava johanna o anila  · Usted tiene manchado o sangrado vaginal     · Usted rompe layton o siente un chorro o gotas de agua tibia que le está bajando por rubalcava vagina  Llame a rubalcava obstetra si:  · Usted tiene más de 5 contracciones en 1 hora  · Usted nota algún cambio en los movimientos de rubalcava bebé  · Usted tiene calambres, presión o tensión abdominal     · Usted tiene un cambio en la secreción vaginal     · Usted tiene escalofríos o fiebre  · Usted tiene comezón, ardor o dolor vaginal     · Usted tiene ned secreción vaginal amarillenta, verdosa, donnie o de [employer]  · Usted tiene dolor o ardor al Erminio Bridgett, orina menos de lo habitual o tiene [place] rosada o sanguinolenta  · Usted tiene preguntas o inquietudes acerca de rubalcava condición o cuidado  Cómo cuidarse en esta etapa de rubalcava embarazo:      · Consuma alimentos saludables y variados  Alimentos saludables incluyen frutas, verduras, panes de cristina integral, alimentos lácteos bajos en grasa, frijoles, abdullahi magras y pescado  [place] líquidos antonieta se le haya indicado  Pregunte cuánto líquido debe iln cada día y cuáles líquidos son los más adecuados para usted  Limite el consumo de cafeína a menos de [address]   Limite el consumo de pescado a 2 porciones cada semana  Escoja pescado con concentraciones bajas de levi antonieta atún al natural enlatado, camarón, salmón, bacalao o tilapia  No coma pescado con concentraciones altas de levi antonieta pez arnol, caballa gigante, pargo rayado y tiburón  · [id number] [place]  Rubalcava necesidad de ciertas vitaminas y [address], antonieta el ácido fólico, aumenta roman el [facility]  Las vitaminas prenatales proporcionan algunas de las vitaminas y minerales adicionales que usted necesita  Las vitaminas prenatales también podrían ayudar a disminuir el riesgo de ciertos defectos de nacimiento  · Descanse tanto antonieta sea necesario  Levante vanesa pies si tiene inflamación en vanesa tobillos y pies  · Consulte con rubalcava médico acerca de hacer ejercicio  El ejercicio moderado puede ayudarla a mantenerse en forma  Rubalcava médico la ayudará a planear un programa de ejercicios que sea seguro para usted roman rubalcava [facility]  · No fume  Fumar aumenta el riesgo de aborto espontáneo y otros problemas de carmencita roman rubalcava [facility]  Fumar puede causar que rubalcava bebé nazca antes de tiempo o que pese menos al nacer  Solicite información a rubalcava médico si usted necesita ayuda para dejar de fumar  · No consuma alcohol  El alcohol pasa de rubalcava cuerpo al bebé a través de la placenta  Puede afectar el desarrollo del cerebro de rubalcava bebé y provocar el síndrome de alcoholismo fetal (SAF)  El SAF es un tong de condiciones que causan problemas [place], de comportamiento y de crecimiento  · Consulte con rubalcava médico antes de lin cualquier medicamento  Muchos medicamentos pueden perjudicar a rubalcava bebé si usted los consuelo [address]  No tome ningún medicamento, vitaminas, hierbas o suplementos sin jose consultar con rubalcava Leanor Pascual  use drogas ilegales o de la segundo (antonieta marihuana o cocaína) mientras está embarazada  Consejos de seguridad roman el embarazo:  · Evite jacuzzis y saunas   No use un jacuzzi o un sauna mientras usted está embarazada, especialmente roman el primer trimestre  Los [facility] y los saunas aumentan la temperatura de rubalcava bebé y el riesgo de defectos de nacimiento  · Evite la toxoplasmosis  Crescent es ned infección causada por comer carne cruda o estar cerca del excremento de un álvaro infectado  Crescent puede causar malformaciones congénitas, aborto espontáneo y Jamie Schein  Lávese las anila después de tocar carne cruda  Asegúrese de que la carne esté melo cocida antes de comerla  Evite los huevos crudos y la Katheleen Raj  Use guantes o pida que alguien la ayude a limpiar la caja de arena del álvaro mientras usted Jolinda Atkinson  · Consulte con rubalcava médico acerca de viajar  El [address] cómodo para viajar es roman el jorge trimestre  Pregunte a rubalcava médico si puede viajar después de las 36 semanas  Es posible que no pueda viajar en avión después de las 36 [facility]  También le puede recomendar que evite los viajes largos por carretera  Cambios que ocurren con rubalcava bebé: Para las 38 semanas, rubalcava bebé podría pesar entre 6 y 5 libras  Rubalcava bebé podría medir alrededor de 14 pulgadas de jonnathan desde la punta de la stephen hasta la rabadilla (parte inferior del bebé)  Rubalcava bebé escucha lo suficientemente melo antonieta para reconocer rubalcava voz  Conforme rubalcava bebé crece más, usted podría sentir menos patadas y sentir más que rubalcava bebé se estira y [place]  Rubalcava bebé podría moverse en posición con la stephen hacia abajo  Rubalcava bebé también descansará en la parte inferior de rubalcava abdomen  Lo que necesita saber acerca del cuidado prenatal: Rubalcava médico le revisará rubalcava presión arterial y [place]  Es posible que también necesite lo siguiente:  · Un examen de orina también podría realizarse para revisarle el azúcar y la proteína  Estas son señales de diabetes gestacional o de infección  La proteína en rubalcava orina también podría ser ned señal de preeclampsia   La preeclampsia es ned condición que puede desarrollarse roman la semana 20 o después en rubalcava embarazo  Esta provoca presión arterial triston y [employer] con vanesa riñones y [place]  · La detección de diabetes gestacional podría realizarse  Rubalcava médico le puede ordenar la curva de tolerancia a la glucosa (OGTT) de 1 paso o de 2 pasos  ? Examen de tolerancia a la glucosa en 1 paso: A usted le revisarán el nivel de azúcar en la ryanne después de que no haya comido por 8 horas (en ayunas)  Luego le darán a lin ned solución de glucosa  Le examinarán el nivel de glucosa nuevamente 1 hora y 2 horas después de terminar la bebida  ? Examen de tolerancia a la glucosa en 2 paso: Usted no tiene que ayunar para la primera parte del examen  Usted se tomará la bebida de glucosa a cualquier hora del día  A usted le revisarán el nivel de azúcar en la ryanne al cabo de 1 hora  En jack que el nivel de azúcar esté más alto que cierto nivel, le ordenarán otro examen  Usted tendrá que ayunar para que le revisen el azúcar en rubalcava ryanne  Usted se tomará la bebida de glucosa  Le examinarán la ryanne de nuevo [address] [place], 2 horas y 3 horas después de terminarse la bebida de glucosa  · Los análisis de ryanne se pueden realizar para revisar si tiene signos de anemia (nivel bajo del olena)  · La vacuna Tdap podría ser recomendado por rubalcava médico     · El examen del estreptococo del tong B es un examen que se realiza para verificar si hay infección por estreptococos del tong B  El estreptococo del tong B es un tipo de bacteria que se puede encontrar en la vagina o el recto  Podría ser transmitida a rubalcava bebé roman el parto si usted la tiene  Rubalcava médico podría lin Lia Richer de rubalcava vagina o recto y marii la Bennie Lien al laboratorio para que la examinen  · La altura uterina es ned medición del útero para controlar el desarrollo de rubalcava bebé  [employer] por lo general es igual al número de [address] que usted tiene de embarazo   Rubalcava médico también podría revisar la posición de rubalcava bebé     · El ritmo cardíaco de rubalcava bebé será revisado  Programe ned vania con rubalcava obstétrico antonieta se le indique: Anote vanesa preguntas para que se acuerde de hacerlas roman vanesa visitas  © Copyright [employer] 2022 Information is for End User's use only and may not be sold, redistributed or otherwise used for commercial purposes  All illustrations and images included in CareNotes® are the copyrighted property of A D A M , [employer]  or [address] [place] [address] es sólo para uso en educación  Rubalcava intención no es darle un consejo médico sobre enfermedades o tratamientos  Colsulte con rubalcava Freeda Crawford farmacéutico antes de seguir cualquier régimen médico para saber si es seguro y efectivo para usted  Movimiento fetal   LO QUE NECESITA SABER:   Los movimientos fetales son las patadas, vueltas e hipo del bebé en el [place]  Es posible que usted empiece a sentir estos movimientos aproximadamente a las [address]  A medida que rubalcava bebé crezca, los movimientos se sienten más melissa y son Larwence Matter frecuentes  Los movimientos del feto comprueban que rubalcava bebé en el útero está recibiendo el oxígeno y los nutrientes necesarios antes de nacer  La reducción de movimientos fetales podría señalar un problema de la carmencita de rubalcava bebé  INSTRUCCIONES SOBRE [facility]:   Gerald Haro a vanesa consultas de control con rubalcava médico u obstetra según le indicaron: Anote vanesa preguntas para que se acuerde de hacerlas roman vanesa visitas  Movimientos normales del feto: La actividad del keyshawn en el útero puede describirse en 4 estados, del menos activo al [place]  Roman el estado de sueño pasivo, es probable que rubalcava nixon por nacer permanezca quieto un lars de 2 horas  Roman el estado de [facility], rubalcava bebé patalea, da vueltas y se mueve con frecuencia  Roman el estado vigilia tranquila, rubalcava nixon podría solamente  vanesa ojos  El estado despierto activo incluye patadas melissa y vueltas    Lo que afecta el movimiento fetal: Es posible que sienta a rubalcava bebé moverse más después de que usted haya comido o bebido cafeína  Es probable que usted sienta menos movimientos de rubalcava bebé en el útero cuando está más [place], antonieta cuando hace ejercicio  También podría sentir menos movimientos del feto si usted es Nathan Montiel  Ciertos medicamentos pueden afectar los movimientos de rubalcava bebé  Infórmele a rubalcava médico los medicamentos que consuelo  Monitoreo de los movimientos del bebé en la casa: La mayoría de los movimientos de rubalcava bebé en el útero se notan cuando usted se acuesta silenciosamente de lado  Es probable que rubalcava médico le pida que cuente los movimientos del feto roman 2 horas  Podría pedirle que registre el tiempo que rubalcava bebé dura para realizar 10 movimientos  Mantenga un registro de los movimientos de rubalcava bebé  Comuníquese con rubalcava médico u obstetra si:  · Tarda más de lo usual para sentir 10 movimientos de rubalcava bebé en el útero  · Usted no siente a rubalcava bebé moverse en el útero por lo menos 10 veces cada 2 horas  · La piel de vanesa anila, pies y alrededor de vanesa ojos se encuentra más inflamada de lo normal     · Usted tiene dolor de stephen roman por lo menos 24 horas  · Le aparecen puntos rojos pequeñitos en la piel  · Rubalcava estómago se siente sensible al aplicarle presión  · Usted tiene preguntas o inquietudes acerca de rubalcava condición o cuidado  Regrese a la adamaris de emergencias si:  · Usted no siente a rubalcava bebé en el útero moverse por 12 horas  · Usted siente calambres o dolor gerry en el abdomen  · Usted tiene sangrado vaginal abundante  · Usted tiene dolor de [place] severo y no puede osiris con claridad  · Usted tiene dificultad para respirar o está vomitando  · Usted sufre ned convulsión  © Copyright [facility] 2022 Information is for End User's use only and may not be sold, redistributed or otherwise used for commercial purposes   All illustrations and images included in CareNotes® are the copyrighted property of A  D A M , Inc  or [facility] [employer] información es sólo para uso en educación  Rubalcava intención no es darle un consejo médico sobre enfermedades o tratamientos  Colsulte con rubalcava Freeda Crawford farmacéutico antes de seguir cualquier régimen médico para saber si es seguro y efectivo para usted

## 2022-03-31 NOTE — PROGRESS NOTES
Moiz Plasencia presents today for routine OB visit at 36w6d  Blood Pressure: 104/64  Wt=73 5 kg (162 lb); Body mass index is 32 72 kg/m² ; TWG=Not found  Fetal Heart Rate: 137; Fundal Height (cm): 36 cm    [id number] used  Abdomen: gravid, soft, non-tender  Denies uterine contractions  Denies vaginal bleeding or leaking of fluid  Reports adequate fetal movement of at least 10 movements in 2 hours once daily  She has had Tdap, she has had flu vaccine  Has had COVID vaccine x2  Her GBS was negative  Her 28 week labs were within normal  Scheduled for ultrasound is indicated, her last ultrasound was 02/28/2022, vertex in normal fetal growth  Contraception-desires Depo-Provera  Breastfeeding and bottle-feeding  Desires epidural  Reviewed  labor precautions, preeclampsia and fetal kick counts  Advised to continue medications and return in 1 weeks    COVID 19 precautions were discussed with patient, reviewed symptoms, masking, hygiene, social distancing, avoid high risk gatherings, patient to call office with questions or concerns      Current Outpatient Medications   Medication Instructions    Prenatal Vit-Fe Fumarate-FA (Prenatal Vitamin) 27-0 8 MG TABS 1 tablet, Oral, Daily         Pregnancy Problems (from 07/15/21 to present)     Problem Noted Resolved    36 weeks gestation of pregnancy 3/9/2022 by JOSEPH Rausch No    Prior fetal macrosomia, antepartum, third trimester 12/6/2021 by Cortez Villarreal MD No

## 2022-04-07 ENCOUNTER — ROUTINE PRENATAL (OUTPATIENT)
Dept: OBGYN CLINIC | Facility: CLINIC | Age: 32
End: 2022-04-07

## 2022-04-07 VITALS
BODY MASS INDEX: 33.06 KG/M2 | HEART RATE: 64 BPM | HEIGHT: 59 IN | WEIGHT: 164 LBS | DIASTOLIC BLOOD PRESSURE: 68 MMHG | SYSTOLIC BLOOD PRESSURE: 104 MMHG

## 2022-04-07 DIAGNOSIS — Z3A.38 38 WEEKS GESTATION OF PREGNANCY: ICD-10-CM

## 2022-04-07 DIAGNOSIS — Z34.93 PRENATAL CARE IN THIRD TRIMESTER: Primary | ICD-10-CM

## 2022-04-07 PROBLEM — Z3A.37 37 WEEKS GESTATION OF PREGNANCY: Status: RESOLVED | Noted: 2022-03-09 | Resolved: 2022-04-07

## 2022-04-07 PROCEDURE — 99213 OFFICE O/P EST LOW 20 MIN: CPT | Performed by: OBSTETRICS & GYNECOLOGY

## 2022-04-07 NOTE — PROGRESS NOTES
Jesusita Mims presents today for routine OB visit at 38w0d  Blood Pressure: 104/68  Wt=74 4 kg (164 lb); Body mass index is 33 12 kg/m² ; TWG=Not found  Fetal Heart Rate: 142; Fundal Height (cm): 36 cm    Abdomen: gravid, soft, non-tender  She reports no complaints  Denies uterine contractions  Denies vaginal bleeding or leaking of fluid  Reports adequate fetal movement of at least 10 movements in 2 hours once daily  GBS negative  Offered elective IOL-pt will consider  Reviewed labor precautions and fetal kick counts as well as pre-eclampsia warning signs  Reviewed perineal massage for decreasing risk of perineal lacerations during delivery  Advised to continue medications and return in 1 week        Current Outpatient Medications   Medication Instructions    Prenatal Vit-Fe Fumarate-FA (Prenatal Vitamin) 27-0 8 MG TABS 1 tablet, Oral, Daily         Pregnancy Problems (from 07/15/21 to present)     Problem Noted Resolved    Prior fetal macrosomia, antepartum, third trimester 12/6/2021 by Joe Mora MD No    37 weeks gestation of pregnancy 3/9/2022 by JOSEPH Quinonez 4/7/2022 by Lashawn Barnett MD

## 2022-04-14 ENCOUNTER — ROUTINE PRENATAL (OUTPATIENT)
Dept: OBGYN CLINIC | Facility: CLINIC | Age: 32
End: 2022-04-14

## 2022-04-14 VITALS
HEART RATE: 71 BPM | WEIGHT: 164 LBS | BODY MASS INDEX: 33.06 KG/M2 | SYSTOLIC BLOOD PRESSURE: 110 MMHG | DIASTOLIC BLOOD PRESSURE: 72 MMHG | HEIGHT: 59 IN

## 2022-04-14 DIAGNOSIS — O09.293 PRIOR FETAL MACROSOMIA, ANTEPARTUM, THIRD TRIMESTER: Primary | ICD-10-CM

## 2022-04-14 DIAGNOSIS — Z78.9 LANGUAGE BARRIER: ICD-10-CM

## 2022-04-14 DIAGNOSIS — Z3A.39 39 WEEKS GESTATION OF PREGNANCY: ICD-10-CM

## 2022-04-14 PROCEDURE — 99213 OFFICE O/P EST LOW 20 MIN: CPT | Performed by: NURSE PRACTITIONER

## 2022-04-14 NOTE — PROGRESS NOTES
Bailee Arevalo presents today for routine OB visit at 39w0d  Faroese interpretation by Eddi Gutierrez  Blood Pressure: 110/72  Wt=74 4 kg (164 lb); Body mass index is 33 12 kg/m² ; TWG=Not found  Fetal Heart Rate: 148; Fundal Height (cm): 38 cm  Abdomen: gravid, soft, non-tender  Denies uterine contractions  Denies vaginal bleeding or leaking of fluid  Reports adequate fetal movement of at least 10 movements in 2 hours once daily  Has had Tdap vaccine  Has had flu vaccine  Has had COVID vaccine x2  Scheduled for ultrasound as indicated, last was 02/28/2022, vertex fetal growth normal   Offered elective IOL- pt declines at this time  History of macrosomia, her 1st baby weighed 9 lb 5 oz  Her subsequent delivery her baby weighed 7 lb 5 oz  Her 1 hour GTT was normal at 103, early 1 hour GTT was also normal   Her hemoglobin was 12 2  Contraception-desires Depo-Provera  Plans on bottle feeding and breastfeeding  Desires epidural   Reviewed  labor precautions, preeclamptic precautions and fetal kick counts  Advised to continue medications and return in 1 weeks    COVID 19 precautions were discussed with patient, reviewed symptoms, masking, hygiene, social distancing, avoid high risk gatherings, patient to call office with questions or concerns      Current Outpatient Medications   Medication Instructions    Prenatal Vit-Fe Fumarate-FA (Prenatal Vitamin) 27-0 8 MG TABS 1 tablet, Oral, Daily         Pregnancy Problems (from 07/15/21 to present)     Problem Noted Resolved    Prior fetal macrosomia, antepartum, third trimester 12/6/2021 by Pamela Yen MD No    37 weeks gestation of pregnancy 3/9/2022 by JOSEPH Najera 4/7/2022 by Erik Little MD

## 2022-04-14 NOTE — PATIENT INSTRUCTIONS
El embarazo de la semana 44 a la 40   CUIDADO AMBULATORIO:   Cambios que están ocurriendo en rubalcava cuerpo: Usted ahora está acercándose a rubalcava fecha de parto  Rubalcava fecha de parto es sólo ned estimación de cuándo nacerá rubalcava bebé  Rubalcava bebé podría nacer antes o después de rubalcava fecha de parto  Podría ser más fácil que usted respire si rubalcava bebé se ha posicionado con la stephen hacia abajo  Es posible que usted necesite orinar con mayor frecuencia ya que el bebé podría estar presionando rubalcava vejiga  Usted también podría sentir más molestias y cansarse fácilmente  También podría tener dificultad para dormir  Busque atención médica de inmediato si:  · Usted presenta un adriane dolor de stephen que no desaparece  · Usted tiene cambios en la visión nuevos o en aumento, antonieta visión borrosa o con manchas  · Usted tiene inflamación nueva o creciente en rubalcava johanna o anila  · Usted tiene manchado o sangrado vaginal     · Usted rompe layton o siente un chorro o gotas de agua tibia que le está bajando por rubalcava vagina  Llame a rubalcava obstetra si:  · Usted tiene más de 5 contracciones en 1 hora  · Usted nota algún cambio en los movimientos de rubalcava bebé  · Usted tiene calambres, presión o tensión abdominal     · Usted tiene un cambio en la secreción vaginal     · Usted tiene escalofríos o fiebre  · Usted tiene comezón, ardor o dolor vaginal     · Usted tiene ned secreción vaginal amarillenta, verdosa, donnie o de [employer]  · Usted tiene dolor o ardor al Carly Hartley, orina menos de lo habitual o tiene [place] rosada o sanguinolenta  · Usted tiene preguntas o inquietudes acerca de rubalcava condición o cuidado  Cómo cuidarse en esta etapa de rubalcava embarazo:      · Consuma alimentos saludables y variados  Alimentos saludables incluyen frutas, verduras, panes de cristina integral, alimentos lácteos bajos en grasa, frijoles, abdullahi magras y pescado  [place] líquidos antonieta se le haya indicado   Pregunte cuánto líquido debe lin cada día y cuáles líquidos son los más adecuados para usted  Limite el consumo de cafeína a menos de [address]  Limite el consumo de pescado a 2 porciones cada semana  Escoja pescado con concentraciones bajas de levi antonieta atún al natural enlatado, camarón, salmón, bacalao o tilapia  No coma pescado con concentraciones altas de levi antonieta pez arnol, caballa gigante, pargo rayado y tiburón  · [id number] [address]  Rubalcava necesidad de ciertas vitaminas y [address], antonieta el ácido fólico, aumenta roman el [facility]  Las vitaminas prenatales proporcionan algunas de las vitaminas y minerales adicionales que usted necesita  Las vitaminas prenatales también podrían ayudar a disminuir el riesgo de ciertos defectos de nacimiento  · Descanse tanto antonieta sea necesario  Levante vanesa pies si tiene inflamación en vanesa tobillos y pies  · Consulte con rubalcava médico acerca de hacer ejercicio  El ejercicio moderado puede ayudarla a mantenerse en forma  Rubalcava médico la ayudará a planear un programa de ejercicios que sea seguro para usted roman rubalcava [facility]  · No fume  Fumar aumenta el riesgo de aborto espontáneo y otros problemas de carmencita roman rubalcava [facility]  Fumar puede causar que rubalcava bebé nazca antes de tiempo o que pese menos al nacer  Solicite información a rubalcava médico si usted necesita ayuda para dejar de fumar  · No consuma alcohol  El alcohol pasa de rubalcava cuerpo al bebé a través de la placenta  Puede afectar el desarrollo del cerebro de rubalcava bebé y provocar el síndrome de alcoholismo fetal (SAF)  El SAF es un tong de condiciones que causan problemas [place], de comportamiento y de crecimiento  · Consulte con rubalcava médico antes de lin cualquier medicamento  Muchos medicamentos pueden perjudicar a rubalcava bebé si usted los consuelo [address]   No tome ningún medicamento, vitaminas, hierbas o suplementos sin jose consultar con rubalcava médico  Nunca  use drogas ilegales o de la segundo (antonieta marihuana o cocaína) mientras está embarazada  Consejos de seguridad roman el embarazo:  · Evite jacuzzis y saunas  No use un jacuzzi o un sauna mientras usted está embarazada, especialmente roman el primer trimestre  Los [employer] y los saunas aumentan la temperatura de daniel bebé y el riesgo de defectos de nacimiento  · Evite la toxoplasmosis  [place] es ned infección causada por comer carne cruda o estar cerca del excremento de un álvaro infectado  [place] puede causar malformaciones congénitas, aborto espontáneo y Jamie Schein  Lávese las anila después de tocar carne cruda  Asegúrese de que la carne esté melo cocida antes de comerla  Evite los huevos crudos y la Clair Eans  Use guantes o pida que alguien la ayude a limpiar la caja de arena del álvaro mientras usted Cydney Johnny  · Consulte con daniel médico acerca de viajar  El [address] cómodo para viajar es roman el jorge trimestre  Pregunte a daniel médico si puede viajar después de las 36 semanas  Es posible que no pueda viajar en avión después de las [facility]  También le puede recomendar que evite los viajes largos por carretera  Cambios que ocurren con daniel bebé: Daniel bebé está listo para nacer  Al momento de nacer, daniel bebé podría pesar alrededor de 6 a 9 libras y medir alrededor de 23 a 21 pulgadas de jonnathan  Daniel bebé podría estar en posición con la stephen hacia abajo  Daniel bebé también descansará en la parte inferior de daniel abdomen  Lo que necesita saber acerca del cuidado prenatal: Daniel médico le revisará daniel presión arterial y [place]  Es posible que también necesite lo siguiente:  · Un examen de orina también podría realizarse para revisarle el azúcar y la proteína  Estas son señales de diabetes gestacional o de infección  La proteína en daniel orina también podría ser ned señal de preeclampsia  La preeclampsia es ned condición que puede desarrollarse roman la semana 21 o después en daniel embarazo   Esta provoca presión arterial triston y puede provocar problemas con vanesa riñones y [place]  · La detección de diabetes gestacional podría realizarse  Rubalcava médico le puede ordenar la curva de tolerancia a la glucosa (OGTT) de 1 paso o de 2 pasos  ? Examen de tolerancia a la glucosa en 1 paso: A usted le revisarán el nivel de azúcar en la ryanne después de que no haya comido por 8 horas (en ayunas)  Luego le darán a lin ned solución de glucosa  Le examinarán el nivel de glucosa nuevamente 1 hora y 2 horas después de terminar la bebida  ? Examen de tolerancia a la glucosa en 2 paso: Usted no tiene que ayunar para la primera parte del examen  Usted se tomará la bebida de glucosa a cualquier hora del día  A usted le revisarán el nivel de azúcar en la ryanne al cabo de 1 hora  En jack que el nivel de azúcar esté más alto que cierto nivel, le ordenarán otro examen  Usted tendrá que ayunar para que le revisen el azúcar en rubalcava ryanne  Usted se tomará la bebida de glucosa  Le examinarán la ryanne de nuevo [address] [place] [address], 2 horas y 3 horas después de terminarse la bebida de glucosa  · El ritmo cardíaco de rubalcava bebé será revisado  Programe ned vania con rubalcava obstétrico antonieta se le indique: Anote vanesa preguntas para que se acuerde de hacerlas roman vanesa visitas  © Copyright [employer] 2022 Information is for End User's use only and may not be sold, redistributed or otherwise used for commercial purposes  All illustrations and images included in CareNotes® are the copyrighted property of A D A M , [employer]  or [address] [place] [address] es sólo para uso en educación  Rubalcava intención no es darle un consejo médico sobre enfermedades o tratamientos  Colsulte con rubalcava Clarine Sours farmacéutico antes de seguir cualquier régimen médico para saber si es seguro y efectivo para usted  Movimiento fetal   LO QUE NECESITA SABER:   Los movimientos fetales son las patadas, vueltas e hipo del bebé en el [place]   Es posible que usted empiece a sentir estos movimientos aproximadamente a las [address]  A medida que rubalcava bebé crezca, los movimientos se sienten más melissa y son [place] frecuentes  Los movimientos del feto comprueban que rubalcava bebé en el útero está recibiendo el oxígeno y los nutrientes necesarios antes de nacer  La reducción de movimientos fetales podría señalar un problema de la carmencita de rubalcava bebé  INSTRUCCIONES SOBRE [facility]:   Sergio Harp a vanesa consultas de control con rubalcava médico u obstetra según le indicaron: Anote vanesa preguntas para que se acuerde de hacerlas roman vanesa visitas  Movimientos normales del feto: La actividad del keyshawn en el útero puede describirse en 4 estados, del menos activo al [place]  Roman el estado de sueño [place], es probable que rubalcava nixon por nacer permanezca quieto un lars de 2 horas  Roman el estado de sueño [place], rubalcava bebé patalea, da vueltas y se mueve con frecuencia  Roman el estado vigilia tranquila, rubalcava nixon podría solamente  vanesa ojos  El estado despierto activo incluye patadas melissa y vueltas  Lo que afecta el movimiento fetal: Es posible que sienta a rubalcava bebé moverse más después de que usted haya comido o bebido cafeína  Es probable que usted sienta menos movimientos de rubalcava bebé en el útero cuando está más [place], antonieta cuando hace ejercicio  También podría sentir menos movimientos del feto si usted es Gloria Ledger  Ciertos medicamentos pueden afectar los movimientos de rubalcava bebé  Infórmele a rubalcava médico los medicamentos que consuelo  Monitoreo de los movimientos del bebé en la casa: La mayoría de los movimientos de rubalcava bebé en el útero se notan cuando usted se acuesta silenciosamente de lado  Es probable que rubalcava médico le pida que cuente los movimientos del feto roman 2 horas  Podría pedirle que registre el tiempo que rubalcava bebé dura para realizar 10 movimientos  Mantenga un registro de los movimientos de rubalcava bebé    Comuníquese con rubalcava médico u obstetra si:  · Tarda más de lo usual para sentir 10 movimientos de rubalcava bebé en el útero  · Usted no siente a rubalcava bebé moverse en el útero por lo menos 10 veces cada 2 horas  · La piel de vanesa anila, pies y alrededor de vanesa ojos se encuentra más inflamada de lo normal     · Usted tiene dolor de stephen roman por lo menos 24 horas  · Le aparecen puntos rojos pequeñitos en la piel  · Rubalcava estómago se siente sensible al aplicarle presión  · Usted tiene preguntas o inquietudes acerca de rubalcava condición o cuidado  Regrese a la adamaris de emergencias si:  · Usted no siente a rubalcava bebé en el útero moverse por 12 horas  · Usted siente calambres o dolor gerry en el abdomen  · Usted tiene sangrado vaginal abundante  · Usted tiene dolor de [place] severo y no puede osiris con claridad  · Usted tiene dificultad para respirar o está vomitando  · Usted sufre ned convulsión  © Copyright [employer] 2022 Information is for End User's use only and may not be sold, redistributed or otherwise used for commercial purposes  All illustrations and images included in CareNotes® are the copyrighted property of A D A [employer]  or [address] [place] [address] es sólo para uso en educación  Rubalcava intención no es darle un consejo médico sobre enfermedades o tratamientos  Colsulte con rubalcava Clarine Sours farmacéutico antes de seguir cualquier régimen médico para saber si es seguro y efectivo para usted

## 2022-04-20 NOTE — PROGRESS NOTES
Romeo Geronimo presents today for routine OB visit at 39w6d    Blood Pressure: 111/71 [place]ayan interpretation done by moniac cox  Wt=74 8 kg (165 lb); Body mass index is 33 33 kg/m² ; TWG=Not found  Fetal Heart Rate: 142; Fundal Height (cm): 38 cm  Abdomen: gravid, soft, non-tender  Denies uterine contractions  Denies vaginal bleeding or leaking of fluid  Reports adequate fetal movement of at least 10 movements in 2 hours once daily  Patient has had Tdap, flu vaccine, COVID vaccine x2  Scheduled for ultrasound as indicated, her last ultrasound 02/28/2022 gross normal, vertex  Patient was offered elective induction of labor at last appointment she declined, she accepts today  She is scheduled for induction tomorrow night 04/22/2022 at 8 p m  Reviewed induction procedure with patient with Cytotec and [place]bot balloon  History of macrosomia with her 1st baby weighed 9 lb 5 oz, her 2nd baby weighed 7 lb 5 oz  Her 1 hour GTT was 103, hemoglobin was 12 2  Contraception-desires Depo-Provera  Plans on bottle feeding and breastfeeding  Plans an epidural  Reviewed  labor precautions, preeclamptic precautions and fetal kick counts    Advised to continue medications       Current Outpatient Medications   Medication Instructions    Prenatal Vit-Fe Fumarate-FA (Prenatal Vitamin) 27-0 8 MG TABS 1 tablet, Oral, Daily         Pregnancy Problems (from 07/15/21 to present)     Problem Noted Resolved    Prior fetal macrosomia, antepartum, third trimester 12/6/2021 by Naty Marrero MD No    37 weeks gestation of pregnancy 3/9/2022 by JOSEPH Landa 4/7/2022 by Gabriele Ocampo MD

## 2022-04-21 ENCOUNTER — ROUTINE PRENATAL (OUTPATIENT)
Dept: OBGYN CLINIC | Facility: CLINIC | Age: 32
End: 2022-04-21

## 2022-04-21 VITALS
DIASTOLIC BLOOD PRESSURE: 71 MMHG | HEART RATE: 65 BPM | WEIGHT: 165 LBS | BODY MASS INDEX: 33.26 KG/M2 | SYSTOLIC BLOOD PRESSURE: 111 MMHG | HEIGHT: 59 IN

## 2022-04-21 DIAGNOSIS — O09.293 PRIOR FETAL MACROSOMIA, ANTEPARTUM, THIRD TRIMESTER: ICD-10-CM

## 2022-04-21 DIAGNOSIS — Z78.9 LANGUAGE BARRIER: Primary | ICD-10-CM

## 2022-04-21 DIAGNOSIS — Z3A.40 40 WEEKS GESTATION OF PREGNANCY: ICD-10-CM

## 2022-04-21 PROCEDURE — 99213 OFFICE O/P EST LOW 20 MIN: CPT | Performed by: NURSE PRACTITIONER

## 2022-04-21 NOTE — PATIENT INSTRUCTIONS
Movimiento fetal   LO QUE NECESITA SABER:   Los movimientos fetales son las patadas, vueltas e hipo del bebé en el [place]  Es posible que usted empiece a sentir estos movimientos aproximadamente a las [address]  A medida que rubalcava bebé crezca, los movimientos se sienten más melissa y son [place] frecuentes  Los movimientos del feto comprueban que rubalcava bebé en el útero está recibiendo el oxígeno y los nutrientes necesarios antes de nacer  La reducción de movimientos fetales podría señalar un problema de la carmencita de rubalcava bebé  INSTRUCCIONES SOBRE [facility]:   Linda Guillaume a vanesa consultas de control con rubalcava médico u obstetra según le indicaron: Anote vanesa preguntas para que se acuerde de hacerlas roman vanesa visitas  Movimientos normales del feto: La actividad del keyshawn en el útero puede describirse en 4 estados, del menos activo al [place] [facility]  Roman el estado de eño [facility], es probable que rubalcava nixon por nacer permanezca quieto un lars de 2 horas  Roman el estado de [facility], rubalcava bebé patalea, da vueltas y se mueve con frecuencia  Roman el estado vigilia tranquila, rubalcava nixon podría solamente  vanesa ojos  El estado despierto activo incluye patadas melissa y vueltas  Lo que afecta el movimiento fetal: Es posible que sienta a rubalcava bebé moverse más después de que usted haya comido o bebido cafeína  Es probable que usted sienta menos movimientos de rubalcava bebé en el útero cuando está más [place], antonieta cuando hace ejercicio  También podría sentir menos movimientos del feto si usted es [place]  Ciertos medicamentos pueden afectar los movimientos de rubalcava bebé  Infórmele a rubalcava médico los medicamentos que consuelo  Monitoreo de los movimientos del bebé en la casa: La mayoría de los movimientos de rubalcava bebé en el útero se notan cuando usted se acuesta silenciosamente de lado  Es probable que rubalcava médico le pida que cuente los movimientos del feto roman 2 horas   Podría pedirle que registre el tiempo que rubalcava bebé dura para realizar 10 movimientos  Mantenga un registro de los movimientos de rubalcava bebé  Comuníquese con rubalcava médico u obstetra si:  · Tarda más de lo usual para sentir 10 movimientos de rubalcava bebé en el útero  · Usted no siente a rubalcava bebé moverse en el útero por lo menos 10 veces cada 2 horas  · La piel de vanesa anila, pies y alrededor de vanesa ojos se encuentra más inflamada de lo normal     · Usted tiene dolor de stephen roman por lo menos 24 horas  · Le aparecen puntos rojos pequeñitos en la piel  · Rubalcava estómago se siente sensible al aplicarle presión  · Usted tiene preguntas o inquietudes acerca de rubalcava condición o cuidado  Regrese a la adamaris de emergencias si:  · Usted no siente a rubalcava bebé en el útero moverse por 12 horas  · Usted siente calambres o dolor gerry en el abdomen  · Usted tiene sangrado vaginal abundante  · Usted tiene dolor de [place] severo y no puede osiris con claridad  · Usted tiene dificultad para respirar o está vomitando  · Usted sufre ned convulsión  © Copyright [employer] 2022 Information is for End User's use only and may not be sold, redistributed or otherwise used for commercial purposes  All illustrations and images included in CareNotes® are the copyrighted property of A D A [employer]  or [address] [place] [address] es sólo para uso en educación  Rubalcava intención no es darle un consejo médico sobre enfermedades o tratamientos  Colsulte con rubalcava Sofia Grapes farmacéutico antes de seguir cualquier régimen médico para saber si es seguro y efectivo para usted

## 2022-04-23 ENCOUNTER — HOSPITAL ENCOUNTER (OUTPATIENT)
Dept: LABOR AND DELIVERY | Facility: HOSPITAL | Age: 32
Discharge: HOME | DRG: 560 | End: 2022-04-23
Payer: COMMERCIAL

## 2022-04-23 ENCOUNTER — HOSPITAL ENCOUNTER (OUTPATIENT)
Facility: HOSPITAL | Age: 32
Discharge: HOME | DRG: 560 | End: 2022-04-23
Attending: OBSTETRICS & GYNECOLOGY | Admitting: OBSTETRICS & GYNECOLOGY
Payer: COMMERCIAL

## 2022-04-23 VITALS
SYSTOLIC BLOOD PRESSURE: 124 MMHG | TEMPERATURE: 98.1 F | HEART RATE: 75 BPM | DIASTOLIC BLOOD PRESSURE: 74 MMHG | RESPIRATION RATE: 18 BRPM

## 2022-04-23 PROCEDURE — NC001 PR NO CHARGE: Performed by: OBSTETRICS & GYNECOLOGY

## 2022-04-23 PROCEDURE — 76815 OB US LIMITED FETUS(S): CPT

## 2022-04-23 PROCEDURE — 99213 OFFICE O/P EST LOW 20 MIN: CPT

## 2022-04-23 RX ORDER — SODIUM CHLORIDE, SODIUM LACTATE, POTASSIUM CHLORIDE, CALCIUM CHLORIDE 600; 310; 30; 20 MG/100ML; MG/100ML; MG/100ML; MG/100ML
125 INJECTION, SOLUTION INTRAVENOUS CONTINUOUS
Status: CANCELLED | OUTPATIENT
Start: 2022-04-23

## 2022-04-23 RX ORDER — ONDANSETRON 2 MG/ML
4 INJECTION INTRAMUSCULAR; INTRAVENOUS EVERY 6 HOURS PRN
Status: CANCELLED | OUTPATIENT
Start: 2022-04-23

## 2022-04-23 RX ORDER — ACETAMINOPHEN 325 MG/1
650 TABLET ORAL EVERY 6 HOURS PRN
Status: CANCELLED | OUTPATIENT
Start: 2022-04-23

## 2022-04-23 RX ORDER — CALCIUM CARBONATE 200(500)MG
500 TABLET,CHEWABLE ORAL 2 TIMES DAILY PRN
Status: CANCELLED | OUTPATIENT
Start: 2022-04-23

## 2022-04-24 ENCOUNTER — HOSPITAL ENCOUNTER (INPATIENT)
Facility: HOSPITAL | Age: 32
LOS: 2 days | Discharge: HOME | DRG: 560 | End: 2022-04-26
Attending: OBSTETRICS & GYNECOLOGY
Payer: COMMERCIAL

## 2022-04-24 ENCOUNTER — HOSPITAL ENCOUNTER (OUTPATIENT)
Dept: LABOR AND DELIVERY | Facility: HOSPITAL | Age: 32
Discharge: HOME | DRG: 560 | End: 2022-04-24
Payer: COMMERCIAL

## 2022-04-24 DIAGNOSIS — Z3A.40 40 WEEKS GESTATION OF PREGNANCY: ICD-10-CM

## 2022-04-24 PROBLEM — Z34.90 ENCOUNTER FOR ELECTIVE INDUCTION OF LABOR: Status: ACTIVE | Noted: 2022-04-24

## 2022-04-24 LAB
ABO GROUP BLD: NORMAL
BASE EXCESS BLDCOA CALC-SCNC: -7.7 MMOL/L (ref 3–11)
BASE EXCESS BLDCOV CALC-SCNC: -6.3 MMOL/L (ref 1–9)
BASOPHILS # BLD AUTO: 0.04 THOUSANDS/ΜL (ref 0–0.1)
BASOPHILS NFR BLD AUTO: 1 % (ref 0–1)
BLD GP AB SCN SERPL QL: NEGATIVE
EOSINOPHIL # BLD AUTO: 0.04 THOUSAND/ΜL (ref 0–0.61)
EOSINOPHIL NFR BLD AUTO: 1 % (ref 0–6)
ERYTHROCYTE [DISTWIDTH] IN BLOOD BY AUTOMATED COUNT: 12.4 % (ref 11.6–15.1)
HCO3 BLDCOA-SCNC: 17.7 MMOL/L (ref 17.3–27.3)
HCO3 BLDCOV-SCNC: 19.1 MMOL/L (ref 12.2–28.6)
HCT VFR BLD AUTO: 38.7 % (ref 34.8–46.1)
HGB BLD-MCNC: 13.3 G/DL (ref 11.5–15.4)
IMM GRANULOCYTES # BLD AUTO: 0.02 THOUSAND/UL (ref 0–0.2)
IMM GRANULOCYTES NFR BLD AUTO: 0 % (ref 0–2)
LYMPHOCYTES # BLD AUTO: 1.82 THOUSANDS/ΜL (ref 0.6–4.47)
LYMPHOCYTES NFR BLD AUTO: 27 % (ref 14–44)
MCH RBC QN AUTO: 31.2 PG (ref 26.8–34.3)
MCHC RBC AUTO-ENTMCNC: 34.4 G/DL (ref 31.4–37.4)
MCV RBC AUTO: 91 FL (ref 82–98)
MONOCYTES # BLD AUTO: 0.65 THOUSAND/ΜL (ref 0.17–1.22)
MONOCYTES NFR BLD AUTO: 10 % (ref 4–12)
NEUTROPHILS # BLD AUTO: 4.13 THOUSANDS/ΜL (ref 1.85–7.62)
NEUTS SEG NFR BLD AUTO: 61 % (ref 43–75)
NRBC BLD AUTO-RTO: 0 /100 WBCS
O2 CT VFR BLDCOA CALC: 10.7 ML/DL
OXYHGB MFR BLDCOA: 54 %
OXYHGB MFR BLDCOV: 76.6 %
PCO2 BLDCOA: 35.8 MM[HG] (ref 30–60)
PCO2 BLDCOV: 37.4 MM HG (ref 27–43)
PH BLDCOA: 7.31 [PH] (ref 7.23–7.43)
PH BLDCOV: 7.33 [PH] (ref 7.19–7.49)
PLATELET # BLD AUTO: 210 THOUSANDS/UL (ref 149–390)
PMV BLD AUTO: 10.5 FL (ref 8.9–12.7)
PO2 BLDCOA: 23.2 MM HG (ref 5–25)
PO2 BLDCOV: 34.3 MM HG (ref 15–45)
RBC # BLD AUTO: 4.26 MILLION/UL (ref 3.81–5.12)
RH BLD: POSITIVE
SAO2 % BLDCOV: 15.5 ML/DL
SPECIMEN EXPIRATION DATE: NORMAL
WBC # BLD AUTO: 6.7 THOUSAND/UL (ref 4.31–10.16)

## 2022-04-24 PROCEDURE — 10907ZC DRAINAGE OF AMNIOTIC FLUID, THERAPEUTIC FROM PRODUCTS OF CONCEPTION, VIA NATURAL OR ARTIFICIAL OPENING: ICD-10-PCS | Performed by: OBSTETRICS & GYNECOLOGY

## 2022-04-24 PROCEDURE — 85025 COMPLETE CBC W/AUTO DIFF WBC: CPT | Performed by: OBSTETRICS & GYNECOLOGY

## 2022-04-24 PROCEDURE — 99024 POSTOP FOLLOW-UP VISIT: CPT | Performed by: OBSTETRICS & GYNECOLOGY

## 2022-04-24 PROCEDURE — 3E033VJ INTRODUCTION OF OTHER HORMONE INTO PERIPHERAL VEIN, PERCUTANEOUS APPROACH: ICD-10-PCS | Performed by: OBSTETRICS & GYNECOLOGY

## 2022-04-24 PROCEDURE — NC001 PR NO CHARGE: Performed by: OBSTETRICS & GYNECOLOGY

## 2022-04-24 PROCEDURE — 86900 BLOOD TYPING SEROLOGIC ABO: CPT | Performed by: OBSTETRICS & GYNECOLOGY

## 2022-04-24 PROCEDURE — 86901 BLOOD TYPING SEROLOGIC RH(D): CPT | Performed by: OBSTETRICS & GYNECOLOGY

## 2022-04-24 PROCEDURE — 86592 SYPHILIS TEST NON-TREP QUAL: CPT | Performed by: OBSTETRICS & GYNECOLOGY

## 2022-04-24 PROCEDURE — 86850 RBC ANTIBODY SCREEN: CPT | Performed by: OBSTETRICS & GYNECOLOGY

## 2022-04-24 PROCEDURE — 4A1HXCZ MONITORING OF PRODUCTS OF CONCEPTION, CARDIAC RATE, EXTERNAL APPROACH: ICD-10-PCS | Performed by: OBSTETRICS & GYNECOLOGY

## 2022-04-24 PROCEDURE — 82805 BLOOD GASES W/O2 SATURATION: CPT | Performed by: OBSTETRICS & GYNECOLOGY

## 2022-04-24 PROCEDURE — 59409 OBSTETRICAL CARE: CPT | Performed by: OBSTETRICS & GYNECOLOGY

## 2022-04-24 PROCEDURE — 0KQM0ZZ REPAIR PERINEUM MUSCLE, OPEN APPROACH: ICD-10-PCS | Performed by: OBSTETRICS & GYNECOLOGY

## 2022-04-24 RX ORDER — ONDANSETRON 2 MG/ML
4 INJECTION INTRAMUSCULAR; INTRAVENOUS EVERY 6 HOURS PRN
Status: DISCONTINUED | OUTPATIENT
Start: 2022-04-24 | End: 2022-04-24

## 2022-04-24 RX ORDER — DIAPER,BRIEF,INFANT-TODD,DISP
1 EACH MISCELLANEOUS DAILY PRN
Status: DISCONTINUED | OUTPATIENT
Start: 2022-04-24 | End: 2022-04-26 | Stop reason: HOSPADM

## 2022-04-24 RX ORDER — ONDANSETRON 2 MG/ML
4 INJECTION INTRAMUSCULAR; INTRAVENOUS EVERY 8 HOURS PRN
Status: DISCONTINUED | OUTPATIENT
Start: 2022-04-24 | End: 2022-04-26 | Stop reason: HOSPADM

## 2022-04-24 RX ORDER — ACETAMINOPHEN 325 MG/1
650 TABLET ORAL EVERY 6 HOURS PRN
Status: DISCONTINUED | OUTPATIENT
Start: 2022-04-24 | End: 2022-04-26 | Stop reason: HOSPADM

## 2022-04-24 RX ORDER — DOCUSATE SODIUM 100 MG/1
100 CAPSULE, LIQUID FILLED ORAL 2 TIMES DAILY
Status: DISCONTINUED | OUTPATIENT
Start: 2022-04-25 | End: 2022-04-26 | Stop reason: HOSPADM

## 2022-04-24 RX ORDER — ACETAMINOPHEN 325 MG/1
650 TABLET ORAL EVERY 6 HOURS PRN
Status: DISCONTINUED | OUTPATIENT
Start: 2022-04-24 | End: 2022-04-24

## 2022-04-24 RX ORDER — OXYCODONE HYDROCHLORIDE 5 MG/1
5 TABLET ORAL EVERY 4 HOURS PRN
Status: DISCONTINUED | OUTPATIENT
Start: 2022-04-24 | End: 2022-04-26 | Stop reason: HOSPADM

## 2022-04-24 RX ORDER — SODIUM CHLORIDE, SODIUM LACTATE, POTASSIUM CHLORIDE, CALCIUM CHLORIDE 600; 310; 30; 20 MG/100ML; MG/100ML; MG/100ML; MG/100ML
125 INJECTION, SOLUTION INTRAVENOUS CONTINUOUS
Status: DISCONTINUED | OUTPATIENT
Start: 2022-04-24 | End: 2022-04-24

## 2022-04-24 RX ORDER — OXYTOCIN/RINGER'S LACTATE 30/500 ML
250 PLASTIC BAG, INJECTION (ML) INTRAVENOUS ONCE
Status: COMPLETED | OUTPATIENT
Start: 2022-04-24 | End: 2022-04-25

## 2022-04-24 RX ORDER — BUTORPHANOL TARTRATE 1 MG/ML
1 INJECTION, SOLUTION INTRAMUSCULAR; INTRAVENOUS ONCE
Status: DISCONTINUED | OUTPATIENT
Start: 2022-04-24 | End: 2022-04-24

## 2022-04-24 RX ORDER — CALCIUM CARBONATE 200(500)MG
1000 TABLET,CHEWABLE ORAL 3 TIMES DAILY PRN
Status: DISCONTINUED | OUTPATIENT
Start: 2022-04-24 | End: 2022-04-24

## 2022-04-24 RX ORDER — DIPHENHYDRAMINE HCL 25 MG
25 TABLET ORAL EVERY 6 HOURS PRN
Status: DISCONTINUED | OUTPATIENT
Start: 2022-04-24 | End: 2022-04-26 | Stop reason: HOSPADM

## 2022-04-24 RX ORDER — IBUPROFEN 600 MG/1
600 TABLET ORAL EVERY 6 HOURS PRN
Status: DISCONTINUED | OUTPATIENT
Start: 2022-04-24 | End: 2022-04-26 | Stop reason: HOSPADM

## 2022-04-24 RX ORDER — CALCIUM CARBONATE 200(500)MG
1000 TABLET,CHEWABLE ORAL DAILY PRN
Status: DISCONTINUED | OUTPATIENT
Start: 2022-04-24 | End: 2022-04-26 | Stop reason: HOSPADM

## 2022-04-24 RX ORDER — BUTORPHANOL TARTRATE 1 MG/ML
1 INJECTION, SOLUTION INTRAMUSCULAR; INTRAVENOUS ONCE
Status: COMPLETED | OUTPATIENT
Start: 2022-04-24 | End: 2022-04-24

## 2022-04-24 RX ORDER — LIDOCAINE HYDROCHLORIDE 10 MG/ML
INJECTION, SOLUTION EPIDURAL; INFILTRATION; INTRACAUDAL; PERINEURAL
Status: COMPLETED
Start: 2022-04-24 | End: 2022-04-24

## 2022-04-24 RX ORDER — OXYTOCIN/RINGER'S LACTATE 30/500 ML
1-30 PLASTIC BAG, INJECTION (ML) INTRAVENOUS
Status: DISCONTINUED | OUTPATIENT
Start: 2022-04-24 | End: 2022-04-24

## 2022-04-24 RX ADMIN — Medication 1 APPLICATION: at 23:18

## 2022-04-24 RX ADMIN — LIDOCAINE HYDROCHLORIDE 10 MG: 10 INJECTION, SOLUTION EPIDURAL; INFILTRATION; INTRACAUDAL; PERINEURAL at 22:10

## 2022-04-24 RX ADMIN — SODIUM CHLORIDE, SODIUM LACTATE, POTASSIUM CHLORIDE, AND CALCIUM CHLORIDE 125 ML/HR: .6; .31; .03; .02 INJECTION, SOLUTION INTRAVENOUS at 21:11

## 2022-04-24 RX ADMIN — IBUPROFEN 600 MG: 600 TABLET ORAL at 23:18

## 2022-04-24 RX ADMIN — Medication 2 MILLI-UNITS/MIN: at 17:16

## 2022-04-24 RX ADMIN — BUTORPHANOL TARTRATE 1 MG: 1 INJECTION, SOLUTION INTRAMUSCULAR; INTRAVENOUS at 19:07

## 2022-04-24 RX ADMIN — SODIUM CHLORIDE, SODIUM LACTATE, POTASSIUM CHLORIDE, AND CALCIUM CHLORIDE 999 ML/HR: .6; .31; .03; .02 INJECTION, SOLUTION INTRAVENOUS at 07:35

## 2022-04-24 RX ADMIN — Medication 250 MILLI-UNITS/MIN: at 22:00

## 2022-04-24 RX ADMIN — SODIUM CHLORIDE, SODIUM LACTATE, POTASSIUM CHLORIDE, AND CALCIUM CHLORIDE 125 ML/HR: .6; .31; .03; .02 INJECTION, SOLUTION INTRAVENOUS at 17:11

## 2022-04-24 NOTE — PLAN OF CARE
Problem: Knowledge Deficit  Goal: Verbalizes understanding of labor plan  Description: Assess patient/family/caregiver's baseline knowledge level and ability to understand information  Provide education via patient/family/caregiver's preferred learning method at appropriate level of understanding  1  Provide teaching at level of understanding  2  Provide teaching via preferred learning method(s)  Outcome: Progressing  Goal: Patient/family/caregiver demonstrates understanding of disease process, treatment plan, medications, and discharge instructions  Description: Complete learning assessment and assess knowledge base  Interventions:  - Provide teaching at level of understanding  - Provide teaching via preferred learning methods  Teaching done via  services in [place]  Outcome: Progressing     Problem: Labor & Delivery  Goal: Manages discomfort  Description: Assess and monitor for signs and symptoms of discomfort  Assess patient's pain level regularly and per hospital policy  Administer medications as ordered  Support use of nonpharmacological methods to help control pain such as distraction, imagery, relaxation, and application of heat and cold  Collaborate with interdisciplinary team and patient to determine appropriate pain management plan  1  Include patient in decisions related to comfort  2  Offer non-pharmacological pain management interventions  3  Report ineffective pain management to physician  Outcome: Progressing  Goal: Patient vital signs are stable  Description: 1  Assess vital signs - vaginal delivery    Outcome: Progressing     Problem: PAIN - ADULT  Goal: Verbalizes/displays adequate comfort level or baseline comfort level  Description: Interventions:  - Encourage patient to monitor pain and request assistance  - Assess pain using appropriate pain scale  - Administer analgesics based on type and severity of pain and evaluate response  - Implement non-pharmacological measures as appropriate and evaluate response  - Consider cultural and social influences on pain and pain management  - Notify physician/advanced practitioner if interventions unsuccessful or patient reports new pain  Outcome: Progressing     Problem: INFECTION - ADULT  Goal: Absence or prevention of progression during hospitalization  Description: INTERVENTIONS:  - Assess and monitor for signs and symptoms of infection  - Monitor lab/diagnostic results  - Monitor all insertion sites, i e  indwelling lines, tubes, and drains  - Monitor endotracheal if appropriate and nasal secretions for changes in amount and color  - [place] appropriate cooling/warming therapies per order  - Administer medications as ordered  - Instruct and encourage patient and family to use good hand hygiene technique  - Identify and instruct in appropriate isolation precautions for identified infection/condition  Outcome: Progressing  Goal: Absence of fever/infection during neutropenic period  Description: INTERVENTIONS:  - Monitor WBC    Outcome: Progressing     Problem: SAFETY ADULT  Goal: Patient will remain free of falls  Description: INTERVENTIONS:  - Educate patient/family on patient safety including physical limitations  - Instruct patient to call for assistance with activity   - Consult OT/PT to assist with strengthening/mobility   - Keep Call bell within reach  - Keep bed low and locked with side rails adjusted as appropriate  - Keep care items and personal belongings within reach  - Initiate and maintain comfort rounds  - Make Fall Risk Sign visible to staff  -  Outcome: Progressing  Goal: Maintain or return to baseline ADL function  Description: INTERVENTIONS:  -  Assess patient's ability to carry out ADLs; assess patient's baseline for ADL function and identify physical deficits which impact ability to perform ADLs (bathing, care of mouth/teeth, toileting, grooming, dressing, etc )  - Assess/evaluate cause of self-care deficits   - Assess range of motion  - Assess patient's mobility; develop plan if impaired  - Assess patient's need for assistive devices and provide as appropriate  - Encourage maximum independence but intervene and supervise when necessary  - Involve family in performance of ADLs  - Assess for home care needs following discharge   - Consider OT consult to assist with ADL evaluation and planning for discharge  - Provide patient education as appropriate  Outcome: Progressing  Goal: Maintains/Returns to pre admission functional level  Description: INTERVENTIONS:  - Perform BMAT or MOVE assessment daily    - Set and communicate daily mobility goal to care team and patient/family/caregiver     -   - Record patient progress and toleration of activity level   Outcome: Progressing     Problem: DISCHARGE PLANNING  Goal: Discharge to home or other facility with appropriate resources  Description: INTERVENTIONS:  - Identify barriers to discharge w/patient and caregiver  - Arrange for needed discharge resources and transportation as appropriate  - Identify discharge learning needs (meds, wound care, etc )  - Arrange for interpretive services to assist at discharge as needed  - Refer to Case Management Department for coordinating discharge planning if the patient needs post-hospital services based on physician/advanced practitioner order or complex needs related to functional status, cognitive ability, or social support system  Outcome: Progressing

## 2022-04-24 NOTE — ASSESSMENT & PLAN NOTE
Consents signed, induction process explained  Plan for pitocin titration  Repeat SVE 2 hours after pitocin started  Continuous EFM  Epidural as needed - patient likely will not want one

## 2022-04-24 NOTE — PLAN OF CARE
Problem: Knowledge Deficit  Goal: Verbalizes understanding of labor plan  Description: Assess patient/family/caregiver's baseline knowledge level and ability to understand information  Provide education via patient/family/caregiver's preferred learning method at appropriate level of understanding  1  Provide teaching at level of understanding  2  Provide teaching via preferred learning method(s)  Outcome: Progressing  Goal: Patient/family/caregiver demonstrates understanding of disease process, treatment plan, medications, and discharge instructions  Description: Complete learning assessment and assess knowledge base  Interventions:  - Provide teaching at level of understanding  - Provide teaching via preferred learning methods  Outcome: Progressing     Problem: Labor & Delivery  Goal: Manages discomfort  Description: Assess and monitor for signs and symptoms of discomfort  Assess patient's pain level regularly and per hospital policy  Administer medications as ordered  Support use of nonpharmacological methods to help control pain such as distraction, imagery, relaxation, and application of heat and cold  Collaborate with interdisciplinary team and patient to determine appropriate pain management plan  1  Include patient in decisions related to comfort  2  Offer non-pharmacological pain management interventions  3  Report ineffective pain management to physician  Outcome: Progressing  Goal: Patient vital signs are stable  Description: 1  Assess vital signs - vaginal delivery    Outcome: Progressing     Problem: PAIN - ADULT  Goal: Verbalizes/displays adequate comfort level or baseline comfort level  Description: Interventions:  - Encourage patient to monitor pain and request assistance  - Assess pain using appropriate pain scale  - Administer analgesics based on type and severity of pain and evaluate response  - Implement non-pharmacological measures as appropriate and evaluate response  - Consider cultural and social influences on pain and pain management  - Notify physician/advanced practitioner if interventions unsuccessful or patient reports new pain  Outcome: Progressing     Problem: INFECTION - ADULT  Goal: Absence or prevention of progression during hospitalization  Description: INTERVENTIONS:  - Assess and monitor for signs and symptoms of infection  - Monitor lab/diagnostic results  - Monitor all insertion sites, i e  indwelling lines, tubes, and drains  - Monitor endotracheal if appropriate and nasal secretions for changes in amount and color  - [place] appropriate cooling/warming therapies per order  - Administer medications as ordered  - Instruct and encourage patient and family to use good hand hygiene technique  - Identify and instruct in appropriate isolation precautions for identified infection/condition  Outcome: Progressing  Goal: Absence of fever/infection during neutropenic period  Description: INTERVENTIONS:  - Monitor WBC    Outcome: Progressing     Problem: SAFETY ADULT  Goal: Patient will remain free of falls  Description: INTERVENTIONS:  - Educate patient/family on patient safety including physical limitations  - Instruct patient to call for assistance with activity   - Consult OT/PT to assist with strengthening/mobility   - Keep Call bell within reach  - Keep bed low and locked with side rails adjusted as appropriate  - Keep care items and personal belongings within reach  - Initiate and maintain comfort rounds  - Make Fall Risk Sign visible to staff  - Offer Toileting every Hours, in advance of need  - Initiate/Maintain alarm  - Obtain necessary fall risk management equipment:   - Apply yellow socks and bracelet for high fall risk patients  - Consider moving patient to room near nurses station  Outcome: Progressing  Goal: Maintain or return to baseline ADL function  Description: INTERVENTIONS:  -  Assess patient's ability to carry out ADLs; assess patient's baseline for ADL function and identify physical deficits which impact ability to perform ADLs (bathing, care of mouth/teeth, toileting, grooming, dressing, etc )  - Assess/evaluate cause of self-care deficits   - Assess range of motion  - Assess patient's mobility; develop plan if impaired  - Assess patient's need for assistive devices and provide as appropriate  - Encourage maximum independence but intervene and supervise when necessary  - Involve family in performance of ADLs  - Assess for home care needs following discharge   - Consider OT consult to assist with ADL evaluation and planning for discharge  - Provide patient education as appropriate  Outcome: Progressing  Goal: Maintains/Returns to pre admission functional level  Description: INTERVENTIONS:  - Perform BMAT or MOVE assessment daily    - Set and communicate daily mobility goal to care team and patient/family/caregiver  - Collaborate with rehabilitation services on mobility goals if consulted  - Perform Range of Motion times a day  - Reposition patient every hours    - Dangle patient times a day  - Stand patient times a day  - Ambulate patient imes a day  - Out of bed to chair times a day   - Out of bed for meals times a day  - Out of bed for toileting  - Record patient progress and toleration of activity level   Outcome: Progressing     Problem: DISCHARGE PLANNING  Goal: Discharge to home or other facility with appropriate resources  Description: INTERVENTIONS:  - Identify barriers to discharge w/patient and caregiver  - Arrange for needed discharge resources and transportation as appropriate  - Identify discharge learning needs (meds, wound care, etc )  - Arrange for interpretive services to assist at discharge as needed  - Refer to Case Management Department for coordinating discharge planning if the patient needs post-hospital services based on physician/advanced practitioner order or complex needs related to functional status, cognitive ability, or social support system  Outcome: Progressing

## 2022-04-24 NOTE — OB LABOR/OXYTOCIN SAFETY PROGRESS
Oxytocin Safety Progress Check Note - Anna Welch 28 y o  female MRN: [id number]    Unit/Bed#: -01 Encounter: [id number]    Dose (agustin-units/min) Oxytocin: 2 agustin-units/min  Contraction Frequency (minutes): 1 5-2 5  Contraction Quality: Moderate  Tachysystole: No   Cervical Dilation: 4        Cervical Effacement: 100  Fetal Station: -2  Baseline Rate: 130 bpm  Fetal Heart Rate: 128 BPM  FHR Category: Category I             Vital Signs:   Vitals:    04/24/22 1830   BP: 121/68   Pulse: 67   Resp:    Temp:        Notes/comments:   Pt is rating pain 7-8/10 at this time, would like IV pain medication but no epidural  SVE as above  FHT cat 1 no q1-2 mins  Will given stadol and plan for AROM after comfortable      Dr Heribetro Hendricks MD 4/24/2022 6:58 PM

## 2022-04-24 NOTE — DISCHARGE INSTRUCTIONS
Early Labor Signs   WHAT YOU SHOULD KNOW:   Early labor signs are changes in your body that allow your baby to pass through your birth canal   AFTER YOU LEAVE:   Signs and symptoms of early labor:   · Lightening  occurs when your baby drops inside your pelvis  You may feel increased pressure in your pelvis  This may happen a few weeks to a few hours before your labor begins  · Contractions  are cramps and tightening that occur in your uterus to help move the baby through your birth canal  Contractions occur regularly and more often each time  Each one lasts about 30 to 70 seconds, and gets stronger and more painful until you deliver your baby  Contractions do not go away with movement  They start in your lower back and move to the front in your abdomen  · Effacement  occurs when your cervix softens and thins, so it can easily open for the baby  Your primary healthcare provider [facility] or obstetrician will examine your cervix for effacement  · Dilation  is widening of your cervix, also for the baby's passage  Your PHP or obstetrician will examine your cervix for dilation  Your cervix will be fully opened and ready for delivery when it is dilated to 10 centimeters  · Increased discharge  from your vagina may occur  It may be pink, clear, or slightly bloody  This discharge may also be called bloody show  Bloody show is a mucus plug that forms and blocks your cervix during pregnancy  · Rupture of membranes  is a sudden release of clear fluid from your vagina  It is also known as when your water breaks  Your PHP or obstetrician may need to break your water if it does not break on its own  False labor: You may have false labor signs, which are also called [place]y Mercedes contractions  False labor is common and may happen several weeks or days before your actual labor  The contractions are not regular, and do not get closer together  The pain is usually mild, does not worsen, and is felt only in front  [place]y [place] contractions may happen later in the day, and stop after you change position, walk, or rest   Contact your PHP or obstetrician if:   · You have pain in your lower back or abdomen  · You have bloody mucus or show  · You have questions or concerns about your condition or care  Seek care immediately or call 911 if:   · You have regular, painful contractions that are less than 5 minutes apart, and last 30 to 70 seconds each  · You have heavy vaginal bleeding  · You have a constant trickle or sudden gush of clear fluid from your vagina  · You notice a sudden decrease in your baby's movement  © 2014 [address] is for End User's use only and may not be sold, redistributed or otherwise used for commercial purposes  All illustrations and images included in CareNotes® are the copyrighted property of [employer]  or Juan Camargo  The above information is an  only  It is not intended as medical advice for individual conditions or treatments  Talk to your doctor, nurse or pharmacist before following any medical regimen to see if it is safe and effective for you

## 2022-04-24 NOTE — OB LABOR/OXYTOCIN SAFETY PROGRESS
Oxytocin Safety Progress Check Note - Romeo Geronimo 28 y o  female MRN: [id number]    Unit/Bed#: -01 Encounter: [id number]    Dose (agustin-units/min) Oxytocin: 2 agustin-units/min  Contraction Frequency (minutes): 1-3  Contraction Quality: Mild,Moderate  Tachysystole: No   Cervical Dilation: 4        Cervical Effacement: 60  Fetal Station: -2  Baseline Rate: 135 bpm  Fetal Heart Rate: 138 BPM  FHR Category: Category I           Vital Signs:   Vitals:    04/24/22 1745   BP: 121/72   Pulse: 74   Resp:    Temp:        Notes/comments:   Pt rates pain 5/10 in her back and lower abdomen at this time, but does not desire an epidural or IV pain medications  SVE as above  FHT cat 1 no q1-2 mins, Will continue pitocin at this level and recheck in 2h or as clinically indicated      Dr Monica Barry aware     Martín Mello MD 4/24/2022 5:54 PM

## 2022-04-24 NOTE — H&P
[facility] MRN: [id number]  Unit/Bed#: LD TRIAGE 2- Encounter: [id number]    Kishore Cm is a patient of ***    SUBJECTIVE:    Chief Complaint: {scmobyo:95309}    HPI: Kishore Cm is a 28 y o  [id number] with an JEANA of 2022, by Last Menstrual Period at 40w3d who is being admitted for {scmobprob:68857}  She {gen contractions:781320}, has {scmLOF:52303}, and reports {scmobvb:92472}  She {scmfm:21143}  Pregnancy Complications/Comments:   ***    Baby complications/comments: none***    Review of Systems    OB History    Para Term  AB Living   3 2 2     2   SAB IAB Ectopic Multiple Live Births           2      # Outcome Date GA Lbr Guzman/2nd Weight Sex Delivery Anes PTL Lv   3 Current            2 Term 11/20/10    M Vag-Spont   ANGELITA   1 Term 08    F Vag-Spont   ANGELITA      Birth Comments: [facility], [place]       No past surgical history on file  Social History     Tobacco Use    Smoking status: Never Smoker    Smokeless tobacco: Never Used   Substance Use Topics    Alcohol use: Not Currently       No Known Allergies    Medications Prior to Admission   Medication    Prenatal Vit-Fe Fumarate-FA (Prenatal Vitamin) 27-0 8 MG TABS           OBJECTIVE:  Vitals:  Temp:  [98 1 °F (36 7 °C)] 98 1 °F (36 7 °C)  HR:  [75] 75  Resp:  [18] 18  BP: (124)/(74) 124/74  There is no height or weight on file to calculate BMI  Physical Exam:    General Appearance: Awake, alert and not in acute distress     CV: RRR   Pulm: Lungs CTA, bilaterally   GI: Gravid Abdomen, Soft, non-tender to palpation in all four quadrants  MSK: Moves upper and lower extremities without difficulty   Lower Extremities: Not edematous, non-erythematous, not painful to palpation      SVE:      FHT:       TOCO:        Prenatal Labs: Reviewed ***     Blood type: {PANCHO BLOOD TYPE:67476}  Antibody: {positive/negative/pending/unknown:87851}  Group B strep: {positive/negative/pending/unknown:18541}  HIV: {positive/negative/pending/unknown:39810}  Hepatitis B: {positive/negative/pending/unknown:90732}  RPR: non-reactive  Rubella: {Desc; immune/not/unknown:87908}  1 hour Glucose: ***  3 hour glucose: ***  Platelets: ***  Hgb: ***    Assessment: 28 y o  [id number] at 40w3d admitted for {scmobprob:74329}    SVE: ***  FHT: ***  Clinical EFW: *** ; Vertex confirmed by ***  GBS status: ***   Postpartum contraception plan: ***      Plan:     Induction of labor/ ***Labor   · Admit  · CBC, RPR, Type & Screen  · Analgesia at maternal request  · Expectant management ***  · Clear Liquid diet   · IVF 125cc/hr ***LR   · Continuous fetal monitoring and tocometry   · Antibiotics ***      ***  ·     Dr Felix Martinez aware    {Expected ODW:23874}  {Admission Status:30412}      Lizbeth Blackmon MD  OB/GYN PGY-2  4/24/2022  6:59 AM

## 2022-04-24 NOTE — PROGRESS NOTES
L&D Triage Note - OB/GYN  Iza Leung 28 y o  female MRN: [id number]  Unit/Bed#: LD TRIAGE 1-01 Encounter: [id number]   [employer]  used      Assessment:  28 y o  [id number] at 40w2d presenting for her elective induction of labor  Due to the acuity of the floor, patient will return in the morning for her induction  Her FHT was reactive, SULEIMAN of 15, and patient is dilated to 1 cm  Patient discharged home after discussion of labor precautions  Plan:  1  IUP @40w2d   NST reactive   1/50/-3   SULEIMAN 15cm   Discharged home with labor precautions   D/W Dr Janki Eaton and Dr Ulysses Andes    ______________________________________________________________________      Chief Complaint:  here for my induction    Subjective:  28 y o  [id number] at 40w2d presenting for her elective induction of labor  Patient states that she feels intermittent light contractions  She denies leakage of fluid, vaginal bleeding and decreased fetal movement  ROS:   Review of Systems   Constitutional: Negative for chills and fever  HENT: Negative for ear pain and sore throat  Eyes: Negative for pain and visual disturbance  Respiratory: Negative for cough and shortness of breath  Cardiovascular: Negative for chest pain and palpitations  Gastrointestinal: Negative for abdominal pain and vomiting  Genitourinary: Negative for dysuria and hematuria  Musculoskeletal: Negative for arthralgias and back pain  Skin: Negative for color change and rash  Neurological: Negative for seizures and syncope  All other systems reviewed and are negative  Objective:  Vitals:    04/23/22 2039   BP: 124/74   Pulse: 75   Resp: 18   Temp: 98 1 °F (36 7 °C)       Physical Exam  Constitutional:       Appearance: Normal appearance  Genitourinary:      Vulva normal    Cardiovascular:      Rate and Rhythm: Normal rate  Pulses: Normal pulses  Pulmonary:      Effort: Pulmonary effort is normal  No respiratory distress     Abdominal: Palpations: Abdomen is soft  Tenderness: There is no abdominal tenderness  Neurological:      Mental Status: She is alert  Skin:     General: Skin is warm and dry  Psychiatric:         Mood and Affect: Mood normal          Behavior: Behavior normal    Vitals reviewed            SVE: 1 / 50% / -3  FHT:  reactive  [place]: q5mins    TAUS  SULEIMAN: 15 70  Vertex      Geraldine Mejia MD 4/23/2022 10:00 PM

## 2022-04-24 NOTE — H&P
History and Physical - Obstetrics  Nas Gamino 28 y o  female MRN: [id number]  Unit/Bed#: Josefina Sandy 2- Encounter: [id number]    ObGyn Provider:  [address] AND PLAN:  Nas Gamino is a 28y o  year-old  at [facility] Day: 1, admitted for elective induction of labor  By issue:    Encounter for elective induction of labor  Assessment & Plan  Consents signed, induction process explained  Plan for pitocin titration  Repeat SVE 2 hours after pitocin started  Continuous EFM  Epidural as needed - patient likely will not want one     40 weeks gestation of pregnancy  Assessment & Plan  Continue PNV  Labs reviewed, WNL    Language barrier  Assessment & Plan  Patient Georgian-speaking  Plan to use professional  during her care    Prior fetal macrosomia, antepartum, third trimester  Assessment & Plan  Previous 9lb   EFW for this pregnancy average 63%ile      The above assessment and plan was discussed with the admitting provider, Dr Jovanny Guevara    Expected LOS: NOT DOCUMENTED  Admission: INPATIENT     SUBJECTIVE:  Chief Complaint:  Here for induction    History of Present Illness:  Nas Gamino is a 28 y o   female at 44w3d, [address] 2022, by Last Menstrual Period, Hospital Day: 1, who presents for elective induction of labor  Patient and FOB are Georgian-speaking only  [place] phone  [facility] ([id number]) used for Georgian interpretation  Patient feeling some irregular contractions on admission  Will likely not want epidural  Had last two children without epidural      Review of Systems   Constitutional: Negative for chills and fever  Respiratory: Negative for chest tightness and shortness of breath  Gastrointestinal: Positive for abdominal pain (feels some irregular contractions)  Genitourinary: Negative for dysuria and vaginal bleeding  Neurological: Negative for dizziness and light-headedness  Psychiatric/Behavioral: Negative for confusion  All other systems reviewed and are negative  Current Pregnancy Problems:  Problem   Encounter for Elective Induction of Labor   40 Weeks Gestation of Pregnancy   Language Barrier   Prior Fetal Macrosomia, Antepartum, Third Trimester       Past Obstetric History:   Patient's last menstrual period was 07/15/2021 (approximate)  OB History    Para Term  AB Living   3 2 2 0 0 2   SAB IAB Ectopic Multiple Live Births   0 0 0 0 2      # Outcome Date GA Lbr Guzman/2nd Weight Sex Delivery Anes PTL Lv   3 Current            2 Term 11/20/10    M Vag-Spont   ANGELITA   1 Term 08    F Vag-Spont   ANGELITA      Birth Comments: [facility] [address]       Pregnancy Plan:  Pregnancy: Valerio     Delivery Plans  Planned delivery method: Vaginal  Planned anesthesia: None  Acceptable blood products: All     Post-Delivery Plans  Feeding intentions: Breast Milk and Non-human milk substitute  Planned birth control: Injection    HISTORICAL INFORMATION:  Past Medical History:   Diagnosis Date    Varicella      No past surgical history on file    Social History   Alcohol use: no  Tobacco use: no  Other substance use: no    Other: no    Family History   Problem Relation Age of Onset    No Known Problems Mother     No Known Problems Father     No Known Problems Sister     No Known Problems Brother     No Known Problems Daughter     No Known Problems Son     No Known Problems Brother     No Known Problems Brother        Allergies:   No Known Allergies    Current Medications:  Current Outpatient Medications   Medication Instructions    Prenatal Vit-Fe Fumarate-FA (Prenatal Vitamin) 27-0 8 MG TABS 1 tablet, Oral, Daily       OBJECTIVE:  Vitals:  Patient Vitals for the past 24 hrs:   BP Temp Temp src Pulse Resp Height Weight   22 0741 -- -- -- -- -- 4' 11" (1 499 m) 74 8 kg (165 lb)   22 0739 105/71 98 °F (36 7 °C) Oral 77 18 -- --     Body mass index is 33 33 kg/m²  Invasive Devices  Report    Peripheral Intravenous Line            Peripheral IV 04/24/22 Left Wrist <1 day                Physical Exam  Constitutional:       General: She is not in acute distress  Appearance: She is normal weight  She is not ill-appearing or diaphoretic  HENT:      Head: Normocephalic and atraumatic  Eyes:      Conjunctiva/sclera: Conjunctivae normal       Pupils: Pupils are equal, round, and reactive to light  Cardiovascular:      Rate and Rhythm: Normal rate  Pulmonary:      Effort: Pulmonary effort is normal  No respiratory distress  Abdominal:      Palpations: Abdomen is soft  Genitourinary:     Comments: Normal appearing external genitalia  No CMT  Musculoskeletal:      Cervical back: Normal range of motion  Skin:     General: Skin is warm and dry  Coloration: Skin is not pale  Neurological:      General: No focal deficit present  Mental Status: She is alert and oriented to person, place, and time  Mental status is at baseline  Psychiatric:         Mood and Affect: Mood normal          Behavior: Behavior normal          Thought Content:  Thought content normal          Cervical Exam:    Cervical Dilation: 2  Cervical Effacement: 60  Cervical Consistency: Medium  Fetal Station: -2  Presentation: Vertex  Position: Unknown  Method: Manual  OB Examiner: Thelma Csatro    Estimated fetal weight:   2/28/2022 at 32w4d  RESULTS     Fetus # 1 of 1  Vertex presentation  Fetal growth appeared normal  Placenta Location = Anterior  Placenta Grade = II     MEASUREMENTS (* Included In Average GA)     AC              29 6 cm        33 weeks 4 days* (78%)  BPD              8 1 cm        32 weeks 2 days* (35%)  HC              30 5 cm        33 weeks 6 days* (48%)  Femur            6 4 cm        33 weeks 0 days* (49%)     Cerebellum       4 1 cm        32 weeks 4 days     HC/AC           1 03 [0 96 - 1 17]                 (39%)  FL/AC             22 [20 - 24]  FL/BPD 79 [71 - 87]  EFW [place] 4   2169 grams - 4 lbs 13 oz                 (63%)     THE AVERAGE GESTATIONAL AGE is 33 weeks 1 day +/- 21 days      AMNIOTIC FLUID     Q1: 6 3      Q2: 5 8      Q3: 3 3      Q4: 6 1  SULEIMAN Total = 21 5 cm  Amniotic Fluid: Normal    Presentation: vertex, confirmed by ultrasound    Membranes: intact  Placenta: anterior     Fetal Heart Tracing:  FHT:   Baseline Rate: 130 bpm  Variability: Moderate 6-25 bpm  Accelerations: 15 x 15 or greater  Decelerations: None  FHR Category: Category I    [place]:  Contraction Frequency (minutes): 3-6  Contraction Duration (seconds): 60-90  Contraction Quality: Mild      Prenatal Labs:  Lab Results   Component Value Date/Time    ABO A 04/24/2022 07:54 AM    RH Positive 04/24/2022 07:54 AM    HGB 13 3 04/24/2022 07:54 AM    HCT 38 7 04/24/2022 07:54 AM     04/24/2022 07:54 AM    HIVAGAB Non-Reactive 10/11/2021 08:43 AM    HEPBSAG Non-reactive 10/11/2021 08:43 AM    RUBELLAIGGQT >175 0 10/11/2021 08:43 AM    [id number] 103 02/17/2022 11:23 AM    [id number] 105 12/23/2021 11:12 AM    STREPGRPB Negative 03/24/2022 01:46 PM        Blood type: A+  Antibody: negative  HIV: negative   Hepatitis B: negative  RPR: negative  Rubella: immune  Varicella: unknown  Gonorrhea/Chlamydia: negative/negative  1 hour Glucose: 105  Group B strep: negative    Other pertinent admission labs:    Imaging, EKG, Pathology, and Other Studies: I have personally reviewed pertinent reports          Sergio Broderick MD  OB/GYN, PGY-3  4/24/2022  11:36 AM

## 2022-04-25 LAB — RPR SER QL: NORMAL

## 2022-04-25 PROCEDURE — 99024 POSTOP FOLLOW-UP VISIT: CPT | Performed by: OBSTETRICS & GYNECOLOGY

## 2022-04-25 RX ADMIN — IBUPROFEN 600 MG: 600 TABLET ORAL at 17:53

## 2022-04-25 RX ADMIN — ACETAMINOPHEN 650 MG: 325 TABLET ORAL at 22:30

## 2022-04-25 RX ADMIN — ACETAMINOPHEN 650 MG: 325 TABLET ORAL at 04:51

## 2022-04-25 RX ADMIN — DOCUSATE SODIUM 100 MG: 100 CAPSULE, LIQUID FILLED ORAL at 08:28

## 2022-04-25 RX ADMIN — ACETAMINOPHEN 650 MG: 325 TABLET ORAL at 15:17

## 2022-04-25 RX ADMIN — DOCUSATE SODIUM 100 MG: 100 CAPSULE, LIQUID FILLED ORAL at 17:53

## 2022-04-25 RX ADMIN — IBUPROFEN 600 MG: 600 TABLET ORAL at 09:26

## 2022-04-25 NOTE — DISCHARGE INSTRUCTIONS
Vaginal Delivery   WHAT YOU SHOULD KNOW:   A vaginal delivery is the birth of your baby through your vagina (birth canal)  AFTER YOU LEAVE:   Medicines:  · NSAIDs  help decrease swelling and pain or fever  This medicine is available with or without a doctor's order  NSAIDs can cause stomach bleeding or kidney problems in certain people  If you take blood thinner medicine, always ask your healthcare provider if NSAIDs are safe for you  Always read the medicine label and follow directions  · Take your medicine as directed  Call your healthcare provider if you think your medicine is not helping or if you have side effects  Tell him if you are allergic to any medicine  Keep a list of the medicines, vitamins, and herbs you take  Include the amounts, and when and why you take them  Bring the list or the pill bottles to follow-up visits  Carry your medicine list with you in case of an emergency  Follow up with your primary healthcare provider:  Most women need to return 6 weeks after a vaginal delivery  Ask about how to care for your wounds or stitches  Write down your questions so you remember to ask them during your visits  Activity:  Rest as much as possible  Try to keep all activities short  You may be able to do some exercise soon after you have your baby  Talk with your primary healthcare provider before you start exercising  If you work outside the home, ask when you can return to your job  Kegel exercises:  Kegel exercises may help your vaginal and rectal muscles heal faster  You can do Kegel exercises by tightening and relaxing the muscles around your vagina  Kegel exercises help make the muscles stronger  Breast care:  When your milk comes in, your breasts may feel full and hard  Ask how to care for your breasts, even if you are not breastfeeding  Constipation:  Do not try to push the bowel movement out if it is too hard   High-fiber foods, extra liquids, and regular exercise can help you prevent constipation  Examples of high-fiber foods are fruit and bran  Prune juice and water are good liquids to drink  Regular exercise helps your digestive system work  You may also be told to take over-the-counter fiber and stool softener medicines  Take these items as directed  Hemorrhoids:  Pregnancy can cause severe hemorrhoids  You may have rectal pain because of the hemorrhoids  Ask how to prevent or treat hemorrhoids  Perineum care: Your perineum is the area between your vagina and anus  Keep the area clean and dry to help it heal and to prevent infection  Wash the area gently with soap and water when you bathe or shower  Rinse your perineum with warm water when you use the toilet  Your primary healthcare provider may suggest you use a warm sitz bath to help decrease pain  A sitz bath is a bathtub or basin filled to hip level  Stay in the sitz bath for 20 to 30 minutes, or as directed  Vaginal discharge: You will have vaginal discharge, called lochia, after your delivery  The lochia is bright red the first day or two after the birth  By the fourth day, the amount decreases, and it turns red-brown  Use a sanitary pad rather than a tampon to prevent a vaginal infection  It is normal to have lochia up to 8 weeks after your baby is born  Monthly periods: Your period may start again within 7 to 12 weeks after your baby is born  If you are breastfeeding, it may take longer for your period to start again  You can still get pregnant again even though you do not have your monthly period  Talk with your primary healthcare provider about a birth control method that will be good for you if you do not want to get pregnant  Mood changes: Many new mothers have some kind of mood changes after delivery  Some of these changes occur because of lack of sleep, hormone changes, and caring for a new baby  Some mood changes can be more serious, such as postpartum depression   Talk with your primary healthcare provider if you feel unable to care for yourself or your baby  Sexual activity:  You may need to avoid sex for 6 to 7 weeks after you have your baby  You may notice you have a decreased desire for sex, or sex may be painful  You may need to use a vaginal lubricant (gel) to help make sex more comfortable  Contact your primary healthcare provider if:   · You have heavy vaginal bleeding that fills 1 or more sanitary pads in 1 hour  · You have a fever  · Your pain does not go away, or gets worse  · The skin between your vagina and rectum is swollen, warm, or red  · You have swollen, hard, or painful breasts  · You feel very sad or depressed  · You feel more tired than usual      · You have questions or concerns about your condition or care  Seek care immediately or call 911 if:   · You have pus or yellow drainage coming from your vagina or wound  · You are urinating very little, or not at all  · Your arm or leg feels warm, tender, and painful  It may look swollen and red  · You feel lightheaded, have sudden and worsening chest pain, or trouble breathing  You may have more pain when you take deep breaths or cough, or you may cough up blood  © 2014 [address] is for End User's use only and may not be sold, redistributed or otherwise used for commercial purposes  All illustrations and images included in CareNotes® are the copyrighted property of [employer]  or Juan Camargo  The above information is an  only  It is not intended as medical advice for individual conditions or treatments  Talk to your doctor, nurse or pharmacist before following any medical regimen to see if it is safe and effective for you  Postpartum Perineal Care   WHAT YOU NEED TO KNOW:   Postpartum perineal care is care for your perineum after you have a baby  The perineum is your vagina and anus     DISCHARGE INSTRUCTIONS:   Care for your perineum:  Healthcare providers will give you a small squirt bottle and show you how to use it  Do the following after you use the toilet and before you put on a new pad:  · Remove the soiled pad    · Use the squirt bottle to rinse your perineum from front to back while you sit on the toilet     · Pat the area dry from front to back with toilet paper or a cotton cloth     · Put on a fresh pad    · Wash your hands  Decrease pain:  Ask your healthcare provider about these and other ways to decrease perineal pain:  · Sitz baths:  Healthcare providers may give you a portable sitz bath  This is a small tub that fits in the toilet  Fill the sitz bath or bathtub with 4 to 6 inches of warm water  Sit in the warm water for 20 minutes 2 to 3 times a day  · Ice:  Ice helps decrease swelling and pain  Ice may also help prevent tissue damage  Use an ice pack, or put crushed ice in a plastic bag  Cover it with a towel and place it on your perineum for 15 to 20 minutes every hour, or as directed  · Medicine spray, wipes, or pads:  Healthcare providers may give you a medicine spray or wipes soaked with numbing medicine to decrease the pain  Pads that contain an herb called witch hazel may also help reduce pain  Use these after perineal care or a sitz bath  Follow up with your healthcare provider as directed:  Write down your questions so you remember to ask them during your visits  Contact your healthcare provider if:   · You have heavy vaginal bleeding that fills 1 or more sanitary pads in 1 hour  · You have foul-smelling vaginal discharge  · You feel weak or lightheaded  · You have questions or concerns about your condition or care  Seek care immediately or call 911 if:   · You have large blood clots or bright red blood coming from your vagina  · You have abdominal pain, vomiting, and a fever    © 2017 [address] Information is for End User's use only and may not be sold, redistributed or otherwise used for commercial purposes  All illustrations and images included in CareNotes® are the copyrighted property of [employer]  or Juan Camargo  The above information is an  only  It is not intended as medical advice for individual conditions or treatments  Talk to your doctor, nurse or pharmacist before following any medical regimen to see if it is safe and effective for you  Postpartum Depression   WHAT YOU NEED TO KNOW:   What is postpartum depression? Postpartum depression is a mood disorder that occurs after giving birth  A mood is an emotion or a feeling  Moods affect your behavior and how you feel about yourself and life in general  Depression is a sad mood that you cannot control  Women often feel sad, afraid, or nervous after their baby is born  These feelings are called postpartum blues or baby blues, and they usually go away in 1 to 2 weeks  With postpartum depression, these symptoms get worse and continue for more than 2 weeks  Postpartum depression is a serious condition that affects your daily activities and relationships  What causes postpartum depression? Healthcare providers do not know exactly what causes postpartum depression  It may be caused by a sudden drop in hormone levels after childbirth  A previous episode of postpartum depression or a family history of depression may increase your risk  Several things may trigger postpartum depression:  · Lack of support from the baby's father or other family members    · Feeling more tired than usual    · Stress, a poor diet, or lack of sleep    · Pain after childbirth or pain during breastfeeding    · Sudden change in lifestyle  How is postpartum depression diagnosed? Postpartum depression affects your daily activities and your relationships with other people  Healthcare providers will ask you questions about your signs and symptoms and how they are affecting your life   The symptoms of postpartum depression usually begin within 1 month after childbirth  You feel depressed or lose interest in activities you enjoy nearly every day for at least 2 weeks  You also have 4 or more of the following symptoms:  · You feel tired or have less energy than usual      · You feel unimportant or guilty most of the time  · You think about hurting or killing yourself  · Your appetite changes  You may lose your appetite and lose weight without trying  Your appetite may also increase and you may gain weight  · You are restless, irritable, or withdrawn  · You have trouble concentrating and remembering things  You have trouble doing daily tasks or making decisions  · You have trouble sleeping, even after the baby is asleep  How is postpartum depression treated? · Psychotherapy:  During therapy, you will talk with healthcare providers about how to cope with your feelings and moods  This can be done alone or in a group  It may also be done with family members or your partner  · Antidepressants: This medicine is given to decrease or stop the symptoms of depression  You usually need to take antidepressants for several weeks before you begin to feel better  Do not stop taking antidepressants unless your healthcare provider tells you to  Healthcare providers may try a different antidepressant if one type does not work  What can I do to feel better? · Rest:  Do not try to do everything all at the same time  Do only what is needed and let other things wait until later  Ask your family or friends for help, especially if you have other children  Ask your partner to help with night feedings or other baby care  Try to sleep when the baby naps  · Get emotional support:  Share your feelings with your partner, a friend, or another mother  · Take care of yourself:  Shower and dress each day  Do not skip meals  Try to get out of the house a little each day  Get regular exercise  Eat a healthy diet   Avoid alcohol because it can make your depression worse  Do not isolate yourself  Go for a walk or meet with a friend  It is also important that you have some time by yourself each day  How do I find support and more information? · [address] [facility], Public Information & Communication Branch  [address], [address], [address]  [place] [address]   Phone: [phone]  Phone: [phone]  Web Address: [facility]  When should I contact my healthcare provider? · You cannot make it to your next visit  · Your depression does not get better with treatment or it gets worse  · You have questions or concerns about your condition or care  When should I seek immediate care or call [facility]? · You think about hurting or killing yourself, your baby, or someone else  · You feel like other people want to hurt you  · You hear voices telling you to hurt yourself or your baby  CARE AGREEMENT:   You have the right to help plan your care  Learn about your health condition and how it may be treated  Discuss treatment options with your caregivers to decide what care you want to receive  You always have the right to refuse treatment  The above information is an  only  It is not intended as medical advice for individual conditions or treatments  Talk to your doctor, nurse or pharmacist before following any medical regimen to see if it is safe and effective for you  © 2017 [facility] Information is for End User's use only and may not be sold, redistributed or otherwise used for commercial purposes  All illustrations and images included in CareNotes® are the copyrighted property of [employer]  or Juan Camargo  Postpartum Bleeding   WHAT YOU NEED TO KNOW:   Postpartum bleeding is vaginal bleeding after childbirth  This bleeding is normal, whether your baby was born vaginally or by    It contains blood and the tissue that lined the inside of your uterus when you were pregnant  DISCHARGE INSTRUCTIONS:   What to expect with postpartum bleeding:  Postpartum bleeding usually lasts at least 10 days, and may last longer than 6 weeks  Your bleeding may range from light (barely staining a pad) to heavy (soaking a pad in 1 hour)  Usually, you have heavier bleeding right after childbirth, which slows over the next few weeks until it stops  The bleeding is red or dark brown with clots for the first 1 to 3 days  It then turns pink for several days, and then becomes a white or yellow discharge until it ends  Follow up with your obstetrician as directed:  Do not have sex until your obstetrician says it is okay  Write down your questions so you remember to ask them during your visits  Contact your healthcare provider or obstetrician if:   · Your bleeding increases, or you have heavy bleeding that soaks a pad in 1 hour for 2 hours in a row  · You pass large blood clots  · You are breathing faster than normal, or your heart is beating faster than normal     · You are urinating less than usual, or not at all  · You feel dizzy  · You have questions or concerns about your condition or care  Seek immediate care or call 911 if:   · You are suddenly short of breath and feel lightheaded  · You have sudden chest pain  © 2017 [address] Information is for End User's use only and may not be sold, redistributed or otherwise used for commercial purposes  All illustrations and images included in CareNotes® are the copyrighted property of [employer]  or Juan Camargo  The above information is an  only  It is not intended as medical advice for individual conditions or treatments  Talk to your doctor, nurse or pharmacist before following any medical regimen to see if it is safe and effective for you        Breast Care for the Breast Feeding Mother   WHAT YOU SHOULD KNOW:   Your breasts will go through normal changes while you are breastfeeding  Sometimes breast and nipple problems can develop while you are breastfeeding  Learn about changes that are normal and those that may be a problem  Breast care can help you prevent and manage problems so you and your baby can enjoy the benefits of breastfeeding  AFTER YOU LEAVE:   Breast changes while you are breastfeeding:   · For the first few days after your baby is born, your body makes a small amount of breast milk (colostrum)  Within about 2 to 5 days, your body will begin making mature milk  It may take up to 10 days or longer for mature milk to come in  When your mature milk comes in, your breasts will become full and firm  They may feel tender  · Breastfeeding your baby will decrease the full feeling in your breasts  You may feel a tingly sensation during feedings as milk is released from your breasts  This is called the milk let-down reflex  After 7 or more days, the fullness may feel like it has decreased  Your nipples should look the same as they did before you started breastfeeding  Breasts that feel full before and empty after breastfeeding are signs that breastfeeding is going well  Breast problems that can occur while you are breastfeeding:   · Nipple soreness  may occur when you begin to breastfeed your baby  You may also have nipple soreness if your baby is not latched on to your breast correctly  Correct positioning and latch-on may decrease or stop the pain in your nipples  Work with your caregivers to help your baby latch on correctly  It may also be helpful to place warm, wet compresses on your nipples to help decrease pain  · Plugged milk ducts  may cause painful breast lumps  Plugged ducts may be caused by not emptying your breasts completely during feedings  When your baby pauses during breastfeeding, massage and gently squeeze your breast  Gentle massage may unplug a blocked milk duct  Pump out any milk left in your breasts after your baby is done breastfeeding  Avoid wearing tight tops, tight bras, or under-wire bras, because they may put pressure on your breasts  · Engorgement  may occur as your milk comes in soon after you begin breastfeeding  Engorgement may cause your breasts to become swollen and painful  Your breasts may also become engorged if you miss a feeding or you do not breastfeed on demand  The best way to decrease engorgement symptoms is to empty your breasts by feeding your baby often  Engorgement can make it hard for your baby to latch on to your breast  If this happens, express a small amount of milk and then have your baby latch on  Cold compresses, gel packs, or ice packs on your breasts can help decrease pain and swelling  Ask your caregiver how often and how long you should use cold, or ice packs  · A breast infection called mastitis  can develop if you have plugged milk ducts or engorgement  Mastitis causes your breasts to become red, swollen, and painful  You may also have flu-like symptoms, such as chills and a fever  Place heat on your breasts to help decrease the pain  You may want to place a moist, warm cloth on the painful breast or both of your breasts  Ask how often to do this  Your primary healthcare provider [facility]) may suggest that you take an NSAID, such as ibuprofen, to decrease pain and swelling  He may also order antibiotics to treat mastitis  Ask about feeding your baby when you have a breast infection  How to help prevent or manage breast problems while you are breastfeeding:   · Learn how to position your baby and latch him on correctly  To latch your baby correctly to your breast, make sure that his mouth covers most of your areola (dark area around your nipple)  He should not be attached only to the nipple  Your baby is latched on well if you feel comfortable and do not feel pain  A correct latch helps him get enough milk and can help to prevent sore nipples and other breast problems   There are several breastfeeding positions that you can try  Find the position that works best for you and your baby  Ask your caregiver for more information about how to hold and breastfeed your baby  · Prevent biting  Your baby may get teeth at about 1to 3months of age  To help prevent biting, break his suction once he is finished or if he has fallen asleep  To break his suction, slip a finger into the side of his mouth  If your baby bites you, respond with surprise or unhappiness  Offer praise when he does not bite you  · Breastfeed your baby regularly  Feed your baby 8 to 12 times a day  You may need to wake up your baby at night to feed him  It is okay to feed from 1 or both breasts at each feeding  Your baby should breastfeed from both breasts equally over the course of a day  If your baby only feeds from 1 side during a feeding, offer your other breast to him first for the next feeding  · Schedule and keep follow-up visits  Talk to your baby's pediatrician or your PHP during follow-up visits if you have breast problems  Caregivers may suggest that you, or you and your partner, attend classes on breastfeeding  You also may want to join a breastfeeding support group  Caregivers may suggest that you see a lactation consultant  This is a caregiver who can help you with breastfeeding  Contact your PHP if:   · You have a fever and chills  · You have body aches and you feel like you do not have any energy  · One or both of your breasts is red, swollen or hard, painful, and feels warm or hot  · You have breast engorgement that does not get better within 24 hours  · You see or feel a lump in your breast that hurts when you touch it  · You have nipple pain during breastfeeding or between feedings  · Your nipples are red, dry, cracked, or bleeding, or they have scabs on them  · You have questions or concerns about your condition or care    © 2014 [address] is for End User's use only and may not be sold, redistributed or otherwise used for commercial purposes  All illustrations and images included in CareNotes® are the copyrighted property of [employer]  or Juan Camargo  The above information is an  only  It is not intended as medical advice for individual conditions or treatments  Talk to your doctor, nurse or pharmacist before following any medical regimen to see if it is safe and effective for you

## 2022-04-25 NOTE — PLAN OF CARE
Problem: PAIN - ADULT  Goal: Verbalizes/displays adequate comfort level or baseline comfort level  Description: Interventions:  - Encourage patient to monitor pain and request assistance  - Assess pain using appropriate pain scale  - Administer analgesics based on type and severity of pain and evaluate response  - Implement non-pharmacological measures as appropriate and evaluate response  - Consider cultural and social influences on pain and pain management  - Notify physician/advanced practitioner if interventions unsuccessful or patient reports new pain  4/25/2022 0916 by Lyndsay Gimenez RN  Outcome: Progressing  4/25/2022 0915 by Lyndsay Gimenez RN  Outcome: Progressing     Problem: INFECTION - ADULT  Goal: Absence or prevention of progression during hospitalization  Description: INTERVENTIONS:  - Assess and monitor for signs and symptoms of infection  - Monitor lab/diagnostic results  - Monitor all insertion sites, i e  indwelling lines, tubes, and drains  - Monitor endotracheal if appropriate and nasal secretions for changes in amount and color  - [place] appropriate cooling/warming therapies per order  - Administer medications as ordered  - Instruct and encourage patient and family to use good hand hygiene technique  - Identify and instruct in appropriate isolation precautions for identified infection/condition  4/25/2022 0916 by Lyndsay Gimenez RN  Outcome: Progressing  4/25/2022 0915 by Lyndsay Gimenez RN  Outcome: Progressing  Goal: Absence of fever/infection during neutropenic period  Description: INTERVENTIONS:  - Monitor WBC    4/25/2022 0916 by Lyndsay Gimenez RN  Outcome: Progressing  4/25/2022 0915 by Lyndsay Gimenez RN  Outcome: Progressing     Problem: SAFETY ADULT  Goal: Patient will remain free of falls  Description: INTERVENTIONS:  - Educate patient/family on patient safety including physical limitations  - Instruct patient to call for assistance with activity   - Consult OT/PT to assist with strengthening/mobility   - Keep Call bell within reach  - Keep bed low and locked with side rails adjusted as appropriate  - Keep care items and personal belongings within reach  - Initiate and maintain comfort rounds  - Make Fall Risk Sign visible to staff  - Offer Toileting every  Hours, in advance of need  - Initiate/Maintain alarm  - Obtain necessary fall risk management equipment:   - Apply yellow socks and bracelet for high fall risk patients  - Consider moving patient to room near nurses station  4/25/2022 0916 by Erin Quinn RN  Outcome: Progressing  4/25/2022 0915 by Erin Quinn RN  Outcome: Progressing  Goal: Maintain or return to baseline ADL function  Description: INTERVENTIONS:  -  Assess patient's ability to carry out ADLs; assess patient's baseline for ADL function and identify physical deficits which impact ability to perform ADLs (bathing, care of mouth/teeth, toileting, grooming, dressing, etc )  - Assess/evaluate cause of self-care deficits   - Assess range of motion  - Assess patient's mobility; develop plan if impaired  - Assess patient's need for assistive devices and provide as appropriate  - Encourage maximum independence but intervene and supervise when necessary  - Involve family in performance of ADLs  - Assess for home care needs following discharge   - Consider OT consult to assist with ADL evaluation and planning for discharge  - Provide patient education as appropriate  4/25/2022 0916 by Erin Quinn RN  Outcome: Progressing  4/25/2022 0915 by Erin Quinn RN  Outcome: Progressing  Goal: Maintains/Returns to pre admission functional level  Description: INTERVENTIONS:  - Perform BMAT or MOVE assessment daily    - Set and communicate daily mobility goal to care team and patient/family/caregiver  - Collaborate with rehabilitation services on mobility goals if consulted  - Perform Range of Motion  times a day  - Reposition patient every  hours    - Dangle patient  times a day  - Stand patient  times a day  - Ambulate patient  times a day  - Out of bed to chair  times a day   - Out of bed for meals  times a day  - Out of bed for toileting  - Record patient progress and toleration of activity level   2022 by Lyndsay Gimenez RN  Outcome: Progressing  2022 by Lyndsay Gimenez RN  Outcome: Progressing     Problem: DISCHARGE PLANNING  Goal: Discharge to home or other facility with appropriate resources  Description: INTERVENTIONS:  - Identify barriers to discharge w/patient and caregiver  - Arrange for needed discharge resources and transportation as appropriate  - Identify discharge learning needs (meds, wound care, etc )  - Arrange for interpretive services to assist at discharge as needed  - Refer to Case Management Department for coordinating discharge planning if the patient needs post-hospital services based on physician/advanced practitioner order or complex needs related to functional status, cognitive ability, or social support system  2022 by Lyndsay Gimenez RN  Outcome: Progressing  2022 by Lyndsay Gimenez RN  Outcome: Progressing     Problem: POSTPARTUM  Goal: Experiences normal postpartum course  Description: INTERVENTIONS:  - Monitor maternal vital signs  - Assess uterine involution and lochia  Outcome: Progressing  Goal: Appropriate maternal -  bonding  Description: INTERVENTIONS:  - Identify family support  - Assess for appropriate maternal/infant bonding   -Encourage maternal/infant bonding opportunities  - Referral to  or  as needed  Outcome: Progressing  Goal: Establishment of infant feeding pattern  Description: INTERVENTIONS:  - Assess breast/bottle feeding  - Refer to lactation as needed  Outcome: Progressing  Goal: Incision(s), wounds(s) or drain site(s) healing without S/S of infection  Description: INTERVENTIONS  - Assess and document dressing, incision, wound bed, drain sites and surrounding tissue  - Provide patient and family education  - Perform skin care/dressing changes every   Outcome: Progressing

## 2022-04-25 NOTE — PLAN OF CARE
Problem: PAIN - ADULT  Goal: Verbalizes/displays adequate comfort level or baseline comfort level  Description: Interventions:  - Encourage patient to monitor pain and request assistance  - Assess pain using appropriate pain scale  - Administer analgesics based on type and severity of pain and evaluate response  - Implement non-pharmacological measures as appropriate and evaluate response  - Consider cultural and social influences on pain and pain management  - Notify physician/advanced practitioner if interventions unsuccessful or patient reports new pain  Outcome: Progressing     Problem: INFECTION - ADULT  Goal: Absence or prevention of progression during hospitalization  Description: INTERVENTIONS:  - Assess and monitor for signs and symptoms of infection  - Monitor lab/diagnostic results  - Monitor all insertion sites, i e  indwelling lines, tubes, and drains  - Monitor endotracheal if appropriate and nasal secretions for changes in amount and color  - [place] appropriate cooling/warming therapies per order  - Administer medications as ordered  - Instruct and encourage patient and family to use good hand hygiene technique  - Identify and instruct in appropriate isolation precautions for identified infection/condition  Outcome: Progressing  Goal: Absence of fever/infection during neutropenic period  Description: INTERVENTIONS:  - Monitor WBC    Outcome: Progressing     Problem: SAFETY ADULT  Goal: Patient will remain free of falls  Description: INTERVENTIONS:  - Educate patient/family on patient safety including physical limitations  - Instruct patient to call for assistance with activity   - Consult OT/PT to assist with strengthening/mobility   - Keep Call bell within reach  - Keep bed low and locked with side rails adjusted as appropriate  - Keep care items and personal belongings within reach  - Initiate and maintain comfort rounds  - Apply yellow socks and bracelet for high fall risk patients  - Consider moving patient to room near nurses station  Outcome: Progressing  Goal: Maintain or return to baseline ADL function  Description: INTERVENTIONS:  -  Assess patient's ability to carry out ADLs; assess patient's baseline for ADL function and identify physical deficits which impact ability to perform ADLs (bathing, care of mouth/teeth, toileting, grooming, dressing, etc )  - Assess/evaluate cause of self-care deficits   - Assess range of motion  - Assess patient's mobility; develop plan if impaired  - Assess patient's need for assistive devices and provide as appropriate  - Encourage maximum independence but intervene and supervise when necessary  - Involve family in performance of ADLs  - Assess for home care needs following discharge   - Consider OT consult to assist with ADL evaluation and planning for discharge  - Provide patient education as appropriate  Outcome: Progressing  Goal: Maintains/Returns to pre admission functional level  Description: INTERVENTIONS:  - Perform BMAT or MOVE assessment daily    - Set and communicate daily mobility goal to care team and patient/family/caregiver     - Collaborate with rehabilitation services on mobility goals if consulted  - Out of bed for toileting  - Record patient progress and toleration of activity level   Outcome: Progressing     Problem: DISCHARGE PLANNING  Goal: Discharge to home or other facility with appropriate resources  Description: INTERVENTIONS:  - Identify barriers to discharge w/patient and caregiver  - Arrange for needed discharge resources and transportation as appropriate  - Identify discharge learning needs (meds, wound care, etc )  - Arrange for interpretive services to assist at discharge as needed  - Refer to Case Management Department for coordinating discharge planning if the patient needs post-hospital services based on physician/advanced practitioner order or complex needs related to functional status, cognitive ability, or social support system  Outcome: Progressing     Problem: Knowledge Deficit  Goal: Verbalizes understanding of labor plan  Description: Assess patient/family/caregiver's baseline knowledge level and ability to understand information  Provide education via patient/family/caregiver's preferred learning method at appropriate level of understanding  1  Provide teaching at level of understanding  2  Provide teaching via preferred learning method(s)  Outcome: Completed  Goal: Patient/family/caregiver demonstrates understanding of disease process, treatment plan, medications, and discharge instructions  Description: Complete learning assessment and assess knowledge base  Interventions:  - Provide teaching at level of understanding  - Provide teaching via preferred learning methods  Outcome: Completed     Problem: Labor & Delivery  Goal: Manages discomfort  Description: Assess and monitor for signs and symptoms of discomfort  Assess patient's pain level regularly and per hospital policy  Administer medications as ordered  Support use of nonpharmacological methods to help control pain such as distraction, imagery, relaxation, and application of heat and cold  Collaborate with interdisciplinary team and patient to determine appropriate pain management plan  1  Include patient in decisions related to comfort  2  Offer non-pharmacological pain management interventions  3  Report ineffective pain management to physician  Outcome: Completed  Goal: Patient vital signs are stable  Description: 1  Assess vital signs - vaginal delivery    Outcome: Completed

## 2022-04-25 NOTE — PROGRESS NOTES
Progress Note - OB/GYN   Adebayo Galdamez Fuentespineda 28 y o  female MRN: [id number]  Unit/Bed#:  311-01 Encounter: [id number]    Assessment:  Post partum Day #1 s/p , stable, baby in room    Plan:  1) Continue routine post partum care   Encourage ambulation   Encourage breastfeeding   Contraception: Depo   Anticipate discharge PPD#2     Subjective/Objective   Chief Complaint:     Post delivery  Patient is doing well  Lochia WNL  Pain well controlled  Subjective:     Pain: yes, cramping, improved with meds  Tolerating PO: yes  Voiding: yes  Flatus: yes  Ambulating: yes  Chest pain: no  Shortness of breath: no  Leg pain: no  Lochia: minimal    Objective:     Vitals: /55 (BP Location: Left arm)   Pulse 67   Temp 97 9 °F (36 6 °C) (Oral)   Resp 18   Ht 4' 11" (1 499 m)   Wt 74 8 kg (165 lb)   LMP 07/15/2021 (Approximate)   SpO2 98%   Breastfeeding Yes   BMI 33 33 kg/m²     I/O        0701   0700  0701   07 07 0700    I V  (mL/kg)  1302 1 (17 4)     Total Intake(mL/kg)  1302 1 (17 4)     Urine (mL/kg/hr)  600 (0 3)     Blood  73     Total Output  673     Net  +629 1            Unmeasured Urine Occurrence  1 x           Lab Results   Component Value Date    WBC 6 70 2022    HGB 13 3 2022    HCT 38 7 2022    MCV 91 2022     2022       Physical Exam:     Gen: AAOx3, NAD  CV: RRR  Lungs: CTA b/l  Abd: Soft, non-tender, non-distended, no rebound or guarding  Uterine fundus firm and non-tender, 1 cm below the umbilicus    Ext: Non tender    Freddie Lopez MD  2022  7:15 AM

## 2022-04-25 NOTE — L&D DELIVERY NOTE
Vaginal Delivery Summary - OB/GYN   Iza Hendersonspineda 28 y o  female MRN: [id number]  Unit/Bed#: -01 Encounter: [id number]          Predelivery Diagnosis:  1  Pregnancy at 40 weeks  2  Hx of fetal macrosomia    Postdelivery Diagnosis:  1  Same as above  2  Delivery of term     Procedure: Spontaneous Vaginal Delivery, repair of second degree perineal laceration    Attending: Jam    Assistant: Bhavin    Anesthesia: none    QBL: pending  Admission H 3  Admission platelets: 393    Complications: none apparent    Specimens: cord blood, arterial and venous cord blood gasses, placenta to storage    Findings:   1  Viable female at 2159, with APGARS of 9 and 9 at 1 and 5 minutes respectively,  2  Spontaneous delivery of intact placenta   3  2 degree laceration repaired with 3-0 Vicryl Rapide  4  Blood gases:    Umbilical Cord Venous Blood Gas:  Results from last 7 days   Lab Units 22  2209   PH COV  7 325   PCO2 COV mm HG 37 4   HCO3 COV mmol/L 19 1   BASE EXC COV mmol/L -6 3*   O2 CT CD VB mL/dL 15 5   O2 HGB, VENOUS CORD % 55 9     Umbilical Cord Arterial Blood Gas:  pending    Disposition:  Patient tolerated the procedure well and was recovering in labor and delivery room     Brief history and labor course:  Ms Sunni Guzman is a 28 y o  [id number] at 37wk4d  She presented to labor and delivery elective IOL  She was started on low dose pitocin and began to make cervical change  AROM was performed for clear fluid  She received one dose of stadol for pain control, did not receive an epidural  She continued to make change to completely dilated and began pushing  Description of procedure    After pushing for 13 minutes, at 2159 patient delivered a viable female , wt pending, apgars of 9 (1 min) and 9 (5 min)  The fetal vertex delivered spontaneously  Baby was checked for nuchal, none noted   The anterior shoulder delivered atraumatically with maternal expulsive forces and the assistance of downward traction  The posterior shoulder delivered with maternal expulsive forces and the assistance of upward traction  The remainder of the fetus delivered spontaneously  Upon delivery, the infant was placed on the mothers abdomen and the cord was clamped and cut  Delayed cord clamping was performed  The infant was noted to cry spontaneously and was moving all extremities appropriately  There was no evidence for injury  Awaiting nurse resuscitators evaluated the   Arterial and venous cord blood gases and cord blood was collected for analysis  These were promptly sent to the lab  In the immediate post-partum, 30 units of IV pitocin was administered, and the uterus was noted to contract down well with massage and pitocin  The placenta delivered spontaneously at 2203 and was noted to have a centrally inserted 3 vessel cord  The vagina, cervix, perineum, and rectum were inspected and there was noted to be a second degree perineal laceration that was repaired with 3-0 Vircyl Rapide  At the conclusion of the procedure, all needle, sponge, and instrument counts were noted to be correct  Patient tolerated the procedure well and was allowed to recover in labor and delivery room with family and  before being transferred to the post-partum floor  Dr Jovanny Guevara was present and participated in all key portions of the case        Frankie Negron MD  2022  10:27 PM

## 2022-04-25 NOTE — DISCHARGE SUMMARY
Discharge Summary - OB/GYN  Mona Gilbert 28 y o  female MRN: [id number]  Unit/Bed#: -01 Encounter: [id number]    Admission Date: 2022     Discharge Date: 22    Admitting Attending: Keyana Gomez MD    Delivering Attending: Dr Ronnell Brown    Discharging Attending: Dr Jarod Scott    Principal Diagnosis: Pregnancy at 40w3d    Secondary Diagnosis:   1  Long interval pregnancy  2  History of macrosomic infant    Procedures: spontaneous vaginal delivery    Anesthesia: local    Hospital course: Mona Gilbert is a 28 y o  [id number] at 40w3d who was initially admitted for an elective induction of labor  Her induction was started with pitocin, and she was artificially ruptured for clear fluid  She progressed to complete dilation and began to push  She delivered a viable female  on 2022 at 2159  Weight 7lbs 9oz via normal spontaneous vaginal delivery  She sustained a second degree laceration during delivery which was adequately repaired  Apgars were 9 (1 min) and 9 (5 min)   was transferred to  nursery  Patient tolerated the procedure well  Her post-delivery course was uncomplicated  Her postpartum pain was well controlled with oral analgesics  On day of discharge, she was ambulating and able to reasonably perform all ADLs  She was voiding and had appropriate bowel function  Pain was well controlled  She was discharged home on postpartum day #2 without complications  Patient was instructed to follow up with her OB as an outpatient and was given appropriate warnings to call provider if she develops signs of infection or uncontrolled pain  Complications: none apparent    Condition at discharge: good     Discharge instructions/Information to patient and family:   See after visit summary for information provided to patient and family        Provisions for Follow-Up Care:  See after visit summary for information related to follow-up care and any pertinent home health orders  Disposition: See After Visit Summary for discharge disposition information  Planned Readmission: No    Discharge medications and instructions:   Discharge instructions/Information to patient and family:   -Do not place anything (no partner, tampons or douche) in your vagina for 6 weeks  -You may walk for exercise for the first 6 weeks then gradually return to your usual activities    -Please do not drive for 1 week if you have no stitches and for 2 weeks if you have stitches or underwent a  delivery     -You may take baths or shower per your preference    -Please look at your bust (breasts) in the mirror daily and call for redness or tenderness or increased warmth    -Please call us for temperature > 100 4*F or 38* C, worsening pain or a foul discharge  Discharge Medications:   Prenatal vitamin daily for 6 months or the duration of nursing whichever is longer    Motrin 600 mg orally every 6 hours as needed for pain  Tylenol (over the counter) per bottle directions as needed for pain: do NOT use with percocet  Hydrocortisone cream 1% (over the counter) applied 1-2x daily to hemorrhoids as needed

## 2022-04-25 NOTE — OB LABOR/OXYTOCIN SAFETY PROGRESS
Oxytocin Safety Progress Check Note - Gurdeep Norton 28 y o  female MRN: [id number]    Unit/Bed#: -01 Encounter: [id number]    Dose (agustin-units/min) Oxytocin: 2 agustin-units/min  Contraction Frequency (minutes): 2  Contraction Quality: Strong  Tachysystole: No   Cervical Dilation: 6        Cervical Effacement: 100  Fetal Station: -2  Baseline Rate: 140 bpm  Fetal Heart Rate: 148 BPM  FHR Category: Category I           Vital Signs:   Vitals:    04/24/22 2116   BP: 116/55   Pulse: 90   Resp:    Temp:        Notes/comments:   Pt is feeling more intense pain with contractions  SVE as above  FHT cat 1 no q1-2 mins  AROM performed with clear fluid  Pitocin held due to tachysystole  Will continue expectant management at this time and recheck in 2h or as clinically indicated      Dr Eduard Nair aware     Staci Resendiz MD 4/24/2022 9:28 PM

## 2022-04-26 VITALS
SYSTOLIC BLOOD PRESSURE: 104 MMHG | BODY MASS INDEX: 33.26 KG/M2 | OXYGEN SATURATION: 98 % | TEMPERATURE: 97.9 F | HEIGHT: 59 IN | WEIGHT: 165 LBS | RESPIRATION RATE: 18 BRPM | HEART RATE: 58 BPM | DIASTOLIC BLOOD PRESSURE: 69 MMHG

## 2022-04-26 PROCEDURE — 99024 POSTOP FOLLOW-UP VISIT: CPT | Performed by: OBSTETRICS & GYNECOLOGY

## 2022-04-26 RX ORDER — ACETAMINOPHEN 325 MG/1
650 TABLET ORAL EVERY 6 HOURS PRN
Qty: 30 TABLET | Refills: 0 | Status: SHIPPED | OUTPATIENT
Start: 2022-04-26

## 2022-04-26 RX ORDER — IBUPROFEN 600 MG/1
600 TABLET ORAL EVERY 6 HOURS PRN
Qty: 30 TABLET | Refills: 0 | Status: SHIPPED | OUTPATIENT
Start: 2022-04-26

## 2022-04-26 RX ADMIN — DOCUSATE SODIUM 100 MG: 100 CAPSULE, LIQUID FILLED ORAL at 09:17

## 2022-04-26 RX ADMIN — IBUPROFEN 600 MG: 600 TABLET ORAL at 06:04

## 2022-04-26 NOTE — PLAN OF CARE
Problem: PAIN - ADULT  Goal: Verbalizes/displays adequate comfort level or baseline comfort level  Description: Interventions:  - Encourage patient to monitor pain and request assistance  - Assess pain using appropriate pain scale  - Administer analgesics based on type and severity of pain and evaluate response  - Implement non-pharmacological measures as appropriate and evaluate response  - Consider cultural and social influences on pain and pain management  - Notify physician/advanced practitioner if interventions unsuccessful or patient reports new pain  Outcome: Progressing     Problem: INFECTION - ADULT  Goal: Absence or prevention of progression during hospitalization  Description: INTERVENTIONS:  - Assess and monitor for signs and symptoms of infection  - Monitor lab/diagnostic results  - Monitor all insertion sites, i e  indwelling lines, tubes, and drains  - Monitor endotracheal if appropriate and nasal secretions for changes in amount and color  - [place] appropriate cooling/warming therapies per order  - Administer medications as ordered  - Instruct and encourage patient and family to use good hand hygiene technique  - Identify and instruct in appropriate isolation precautions for identified infection/condition  Outcome: Progressing  Goal: Absence of fever/infection during neutropenic period  Description: INTERVENTIONS:  - Monitor WBC    Outcome: Progressing     Problem: SAFETY ADULT  Goal: Patient will remain free of falls  Description: INTERVENTIONS:  - Educate patient/family on patient safety including physical limitations  - Instruct patient to call for assistance with activity   - Consult OT/PT to assist with strengthening/mobility   - Keep Call bell within reach  - Keep bed low and locked with side rails adjusted as appropriate  - Keep care items and personal belongings within reach  - Initiate and maintain comfort rounds  - Make Fall Risk Sign visible to staff  - Offer Toileting every  Hours, in advance of need  - Initiate/Maintain alarm  - Obtain necessary fall risk management equipment:   - Apply yellow socks and bracelet for high fall risk patients  - Consider moving patient to room near nurses station  Outcome: Progressing  Goal: Maintain or return to baseline ADL function  Description: INTERVENTIONS:  -  Assess patient's ability to carry out ADLs; assess patient's baseline for ADL function and identify physical deficits which impact ability to perform ADLs (bathing, care of mouth/teeth, toileting, grooming, dressing, etc )  - Assess/evaluate cause of self-care deficits   - Assess range of motion  - Assess patient's mobility; develop plan if impaired  - Assess patient's need for assistive devices and provide as appropriate  - Encourage maximum independence but intervene and supervise when necessary  - Involve family in performance of ADLs  - Assess for home care needs following discharge   - Consider OT consult to assist with ADL evaluation and planning for discharge  - Provide patient education as appropriate  Outcome: Progressing  Goal: Maintains/Returns to pre admission functional level  Description: INTERVENTIONS:  - Perform BMAT or MOVE assessment daily    - Set and communicate daily mobility goal to care team and patient/family/caregiver  - Collaborate with rehabilitation services on mobility goals if consulted  - Perform Range of Motion  times a day  - Reposition patient every  hours    - Dangle patient  times a day  - Stand patient  times a day  - Ambulate patient  times a day  - Out of bed to chair  times a day   - Out of bed for meals  times a day  - Out of bed for toileting  - Record patient progress and toleration of activity level   Outcome: Progressing     Problem: DISCHARGE PLANNING  Goal: Discharge to home or other facility with appropriate resources  Description: INTERVENTIONS:  - Identify barriers to discharge w/patient and caregiver  - Arrange for needed discharge resources and transportation as appropriate  - Identify discharge learning needs (meds, wound care, etc )  - Arrange for interpretive services to assist at discharge as needed  - Refer to Case Management Department for coordinating discharge planning if the patient needs post-hospital services based on physician/advanced practitioner order or complex needs related to functional status, cognitive ability, or social support system  Outcome: Progressing     Problem: POSTPARTUM  Goal: Experiences normal postpartum course  Description: INTERVENTIONS:  - Monitor maternal vital signs  - Assess uterine involution and lochia  Outcome: Progressing  Goal: Appropriate maternal -  bonding  Description: INTERVENTIONS:  - Identify family support  - Assess for appropriate maternal/infant bonding   -Encourage maternal/infant bonding opportunities  - Referral to  or  as needed  Outcome: Progressing  Goal: Establishment of infant feeding pattern  Description: INTERVENTIONS:  - Assess breast/bottle feeding  - Refer to lactation as needed  Outcome: Progressing  Goal: Incision(s), wounds(s) or drain site(s) healing without S/S of infection  Description: INTERVENTIONS  - Assess and document dressing, incision, wound bed, drain sites and surrounding tissue  - Provide patient and family education  - Perform skin care/dressing changes every   Outcome: Progressing

## 2022-04-26 NOTE — LACTATION NOTE
This note was copied from a baby's chart  CONSULT - LACTATION  Baby Girl Lieutenant Swan) Xochitl 2 days female MRN: [id number]    [facility] NURSERY Room / Bed: (N)/(N) Encounter: [id number]    Maternal Information     MOTHER:  Iza Leung  Maternal Age: 28 y o    OB History: # 1 - Date: 08, Sex: Female, Weight: None, GA: None, Delivery: Vaginal, Spontaneous, Apgar1: None, Apgar5: None, Living: Living, Birth Comments: [address]    # 2 - Date: 11/20/10, Sex: Male, Weight: None, GA: None, Delivery: Vaginal, Spontaneous, Apgar1: None, Apgar5: None, Living: Living, Birth Comments: None    # 3 - Date: 22, Sex: Female, Weight: 3430 g (7 lb 9 oz), GA: 40w3d, Delivery: Vaginal, Spontaneous, Apgar1: 9, Apgar5: 9, Living: Living, Birth Comments: None   Previouse breast reduction surgery? No    Lactation history:   Has patient previously breast fed: Yes   How long had patient previously breast fed: 2-4 mo   Previous breast feeding complications: Low milk supply (offered formual early )   No past surgical history on file  Birth information:  YOB: 2021   Time of birth: 9:59 PM   Sex: female   Delivery type: Vaginal, Spontaneous   Birth Weight: 3430 g (7 lb 9 oz)   Percent of Weight Change: -2%     Gestational Age: 44w3d   [unfilled]    Assessment     Breast and nipple assessment: normal assessment    [place] Assessment: normal assessment    Feeding recommendations:  breast feed on demand     Met with mother  Provided mother with Ready, Set, Baby booklet in [address] with   Discussed Skin to Skin contact an benefits to mom and baby  Talked about the delay of the first bath until baby has adjusted  Spoke about the benefits of rooming in  Feeding on cue and what that means for recognizing infant's hunger  Avoidance of pacifiers for the first month discussed   Talked about exclusive breastfeeding for the first 6 months  Positioning and latch reviewed as well as showing images of other feeding positions  Discussed the properties of a good latch in any position  Reviewed hand/manual expression  Discussed s/s that baby is getting enough milk and some s/s that breastfeeding dyad may need further help  Gave information on common concerns, what to expect the first few weeks after delivery, preparing for other caregivers, and how partners can help  Resources for support also provided  Information on hand expression given  Discussed benefits of knowing how to manually express breast including stimulating milk supply, softening nipple for latch and evacuating breast in the event of engorgement  Discussed 2nd night syndrome and ways to calm infant  Hand out given  Provided DC booklet at this time, enc family to review and prepare questions for day of DC  Baby latching well, per Mom  Mom concerned she has no milk  Discussed stomach size, colostrum benefits, nutritional needs  Mom verbalizes understanding  Demonstrated hand expression at this time, Mom has cracking to L nipple  Enc her to call for latching evaluation the next time baby shows cues       Kourtney Coates RN 4/26/2022 10:55 AM

## 2022-04-26 NOTE — CASE MANAGEMENT
Case Management Progress Note    Patient name Gaby Senior  Location /-21 MRN [id number]  : 1990 Date 2022       LOS (days): 2  Geometric Mean LOS (GMLOS) (days):   Days to GMLOS:        OBJECTIVE:        Current admission status: Inpatient  Preferred Pharmacy:   [id number] [place], [address]  Phone: [phone] Fax: [phone]    Primary Care Provider: No primary care provider on file  Primary Insurance: Brittani Ogden  Secondary Insurance:     PROGRESS NOTE:    Breast pump consult: [facility] [place] order entered for Medela pump for room delivery via [employer]; no additional needs noted

## 2022-04-26 NOTE — PROGRESS NOTES
Progress Note - OB/GYN   Iza Fuentespineda 28 y o  female MRN: [id number]  Unit/Bed#:  311-01 Encounter: [id number]    Assessment:  Post partum Day #2 s/pSVD, stable, baby in room  Plan:  Continue routine post partum care   Encourage ambulation   Encourage breastfeeding   Anticipate discharge today    Depo for contraception    Subjective/Objective   Chief Complaint:     Post delivery  Patient is doing well  Lochia WNL  Pain well controlled  Subjective:     Pain: yes, cramping, improved with meds  Tolerating PO: yes  Voiding: yes  Flatus: yes  Ambulating: yes  Chest pain: no  Shortness of breath: no  Leg pain: no  Lochia: minimal    Objective:     Vitals: BP 94/53 Comment: nurse Darryl Bloodgood notified  Pulse 72   Temp 98 °F (36 7 °C) (Oral)   Resp 16   Ht 4' 11" (1 499 m)   Wt 74 8 kg (165 lb)   LMP 07/15/2021 (Approximate)   SpO2 98%   Breastfeeding Yes   BMI 33 33 kg/m²       Intake/Output Summary (Last 24 hours) at 4/26/2022 0552  Last data filed at 4/25/2022 1200  Gross per 24 hour   Intake --   Output 250 ml   Net -250 ml       Lab Results   Component Value Date    WBC 6 70 04/24/2022    HGB 13 3 04/24/2022    HCT 38 7 04/24/2022    MCV 91 04/24/2022     04/24/2022       Physical Exam:     Gen: AAOx3, NAD  CV: RRR  Lungs: CTA b/l  Abd: Soft, non-tender, non-distended, no rebound or guarding  Uterine fundus firm and non-tender, 1 cm below the umbilicus     Ext: Non tender    Salli Needle, DO  4/26/2022  5:52 AM

## 2022-04-26 NOTE — PLAN OF CARE
Problem: PAIN - ADULT  Goal: Verbalizes/displays adequate comfort level or baseline comfort level  Description: Interventions:  - Encourage patient to monitor pain and request assistance  - Assess pain using appropriate pain scale  - Administer analgesics based on type and severity of pain and evaluate response  - Implement non-pharmacological measures as appropriate and evaluate response  - Consider cultural and social influences on pain and pain management  - Notify physician/advanced practitioner if interventions unsuccessful or patient reports new pain  Outcome: Adequate for Discharge     Problem: POSTPARTUM  Goal: Experiences normal postpartum course  Description: INTERVENTIONS:  - Monitor maternal vital signs  - Assess uterine involution and lochia  Outcome: Adequate for Discharge

## 2022-04-27 NOTE — UTILIZATION REVIEW
Inpatient Admission Authorization Request   Notification of Maternity/Delivery &  Birth Information for Admission   SERVICING FACILITY:   [address]  Tax ID: [id number]  NPI: [id number]  Place of Service: Inpatient [facility]  [address]  Place of Service Code: 24     ATTENDING PROVIDER:  Attending Name and NPI#: Leann Vaughn Md [[id number]]  Address: Yolande Badillo  [address]  Phone: [phone]     UTILIZATION REVIEW CONTACT:  Baldemar Shepherd, Utilization Review Supervisor  Network Utilization Review Department  Phone: [phone]  Fax [phone]  Email: [email or web address]     PHYSICIAN ADVISORY SERVICES:  FOR [id number] REVIEW - MEDICAL NECESSITY DENIAL  Phone: [phone]  Fax: [phone]  Email: [email or web address]     TYPE OF REQUEST:  Inpatient Status     ADMISSION INFORMATION:  ADMISSION DATE/TIME: 22  6:54 AM  PATIENT DIAGNOSIS CODE/DESCRIPTION:  40 weeks gestation of pregnancy [Z3A 40]  Encounter for induction of labor [Z34 90]  Encounter for full-term uncomplicated delivery [U61] The primary encounter diagnosis was  (spontaneous vaginal delivery)  A diagnosis of 40 weeks gestation of pregnancy was also pertinent to this visit  1   (spontaneous vaginal delivery)    2  40 weeks gestation of pregnancy      DISCHARGE DATE/TIME: 2022  4:00 PM   MOTHER AND  INFORMATION:  Mother: Junie Delgado 1990   Delivering clinician: [facility]   OB History        3    Para   3    Term   3            AB        Living   3       SAB        IAB        Ectopic        Multiple   0    Live Births   3                Name & MRN:   Information for the patient's :  Lavern Michelle Girl Lia [place]) [[id number]]      Delivery Information:  Sex: female  Delivered 2022 9:59 PM by Vaginal, Spontaneous;  Gestational Age: 44w3d     Measurements:  Weight: 7 lb 9 oz (3430 g); Height: 19 5"    APGAR 1 minute 5 minutes 10 minutes   Totals: 9 9      [place] Birth Information: 28 y o  female MRN: [id number] Unit/Bed#: -01 Estimated Date of Delivery: 22  Birthweight: No birth weight on file  Gestational Age: <None> Delivery Type: Vaginal, Spontaneous    IMPORTANT INFORMATION:  Please contact the Kerry Ricks directly with any questions or concerns regarding this request  Department voicemails are confidential     Send requests for admission clinical reviews, concurrent reviews, approvals, and administrative denials due to lack of clinical to fax [phone]

## 2022-04-28 ENCOUNTER — TELEPHONE (OUTPATIENT)
Dept: OBGYN CLINIC | Facility: CLINIC | Age: 32
End: 2022-04-28

## 2022-04-28 NOTE — TELEPHONE ENCOUNTER
Attempt to reach patient for transition of care  Message left for patient to call office with any questions/concerns  Also instructed to schedule 3 week PP visit-- due after 5/15

## 2022-05-01 LAB — PLACENTA IN STORAGE: NORMAL

## 2022-05-02 ENCOUNTER — TELEPHONE (OUTPATIENT)
Dept: OBGYN CLINIC | Facility: CLINIC | Age: 32
End: 2022-05-02

## 2022-05-16 ENCOUNTER — POSTPARTUM VISIT (OUTPATIENT)
Dept: OBGYN CLINIC | Facility: CLINIC | Age: 32
End: 2022-05-16

## 2022-05-16 VITALS
SYSTOLIC BLOOD PRESSURE: 107 MMHG | BODY MASS INDEX: 30.32 KG/M2 | WEIGHT: 150.4 LBS | DIASTOLIC BLOOD PRESSURE: 72 MMHG | HEIGHT: 59 IN | HEART RATE: 66 BPM

## 2022-05-16 DIAGNOSIS — Z30.013 ENCOUNTER FOR INITIAL PRESCRIPTION OF INJECTABLE CONTRACEPTIVE: Primary | ICD-10-CM

## 2022-05-16 PROCEDURE — 99213 OFFICE O/P EST LOW 20 MIN: CPT | Performed by: NURSE PRACTITIONER

## 2022-05-16 RX ORDER — MEDROXYPROGESTERONE ACETATE 150 MG/ML
150 INJECTION, SUSPENSION INTRAMUSCULAR
Qty: 1 ML | Refills: 2 | Status: SHIPPED | OUTPATIENT
Start: 2022-05-16

## 2022-05-16 NOTE — PROGRESS NOTES
Subjective     Marshall Evangelista is a 28 y o  y o  female [id number] who presents for a postpartum visit  She is 3 weeks postpartum following a spontaneous vaginal delivery on 2022  Pregnancy was complicated by long interval pregnancy, history of macrosomic infant  She delivered 2022, baby weighed 7 lb 9 oz she had baby girl, Apgars were 9 and 9  She had a second-degree laceration  She denies any concerns since she delivered    I have fully reviewed the prenatal and intrapartum course  The delivery was at 40 weeks and 3 days gestational weeks  Outcome: spontaneous vaginal delivery  Anesthesia: local  Postpartum course has been uneventful  Baby's course has been uneventful  Baby is feeding by both breast and bottle - Similac Advance  Bleeding thin lochia  Bowel function is normal  Bladder function is normal  Patient is sexually active  Contraception method is desires Depo Provera  Postpartum depression screening: negative  Scored a 1 on depression screen, she denies any PPD symptoms  The following portions of the patient's history were reviewed and updated as appropriate: allergies, current medications, past family history, past medical history, past social history, past surgical history and problem list     Review of Systems  Pertinent items are noted in HPI       Objective     /72   Pulse 66   Ht 4' 11" (1 499 m)   Wt 68 2 kg (150 lb 6 4 oz)   LMP 07/15/2021 (Approximate)   BMI 30 38 kg/m²     General:   appears stated age, cooperative, alert normal mood and affect   Neck: Neck: normal, supple,trachea midline, no masses   Heart: regular rate and rhythm, S1, S2 normal, no murmur, click, rub or gallop   Lungs: clear to auscultation bilaterally   Breasts: Denies concerns   Abdomen: soft, non-tender, without masses or organomegaly   Vulva: Denies any concerns   Vagina:    Urethra:    Cervix:    Uterus:    Adnexa:      Assessment/Plan     3wks  postpartum exam,    Pap smear not done at today's visit  Her last Pap and HPV was 11/04/2021 and was negative    1  Contraception: Depo-Provera injections  2  Annual due 11/2022  3  Follow up in: 6 months for her annual  or as needed

## 2022-05-26 ENCOUNTER — CLINICAL SUPPORT (OUTPATIENT)
Dept: OBGYN CLINIC | Facility: CLINIC | Age: 32
End: 2022-05-26

## 2022-05-26 DIAGNOSIS — Z30.42 ENCOUNTER FOR DEPO-PROVERA CONTRACEPTION: Primary | ICD-10-CM

## 2022-05-26 LAB — SL AMB POCT URINE HCG: NEGATIVE

## 2022-05-26 PROCEDURE — 81025 URINE PREGNANCY TEST: CPT

## 2022-05-26 PROCEDURE — 96372 THER/PROPH/DIAG INJ SC/IM: CPT

## 2022-05-26 RX ORDER — MEDROXYPROGESTERONE ACETATE 150 MG/ML
150 INJECTION, SUSPENSION INTRAMUSCULAR
Status: SHIPPED | OUTPATIENT
Start: 2022-05-26

## 2022-05-26 RX ADMIN — MEDROXYPROGESTERONE ACETATE 150 MG: 150 INJECTION, SUSPENSION INTRAMUSCULAR at 17:03

## 2022-05-26 NOTE — PROGRESS NOTES
Depo-Provera      [x]   Patient provided box    o 2 Refills remain  o Refills submitted no   Last  Annual Date / Birth control check : Post Partum visit 5/16/22   Last Depo date: 1st depo today   Side effects: no   HCG: yes  o if applicable: negative   Given by: Travis Colon MA   Site: Right Deltoid    o Calcium supplement daily teaching  o Condoms for 2 weeks following first injection dose

## 2023-01-17 ENCOUNTER — VBI (OUTPATIENT)
Dept: ADMINISTRATIVE | Facility: OTHER | Age: 33
End: 2023-01-17